# Patient Record
Sex: FEMALE | Race: BLACK OR AFRICAN AMERICAN | Employment: OTHER | URBAN - METROPOLITAN AREA
[De-identification: names, ages, dates, MRNs, and addresses within clinical notes are randomized per-mention and may not be internally consistent; named-entity substitution may affect disease eponyms.]

---

## 2018-04-18 ENCOUNTER — OP HISTORICAL/CONVERTED ENCOUNTER (OUTPATIENT)
Dept: OTHER | Age: 73
End: 2018-04-18

## 2018-04-23 ENCOUNTER — OP HISTORICAL/CONVERTED ENCOUNTER (OUTPATIENT)
Dept: OTHER | Age: 73
End: 2018-04-23

## 2020-10-21 ENCOUNTER — TRANSCRIBE ORDER (OUTPATIENT)
Dept: SCHEDULING | Age: 75
End: 2020-10-21

## 2020-12-13 ENCOUNTER — HOSPITAL ENCOUNTER (OUTPATIENT)
Dept: PREADMISSION TESTING | Age: 75
Discharge: HOME | End: 2020-12-13
Payer: MEDICARE

## 2020-12-13 LAB — SARS-COV-2, COV2: NORMAL

## 2020-12-13 PROCEDURE — 87635 SARS-COV-2 COVID-19 AMP PRB: CPT

## 2020-12-14 LAB — SARS-COV-2, COV2NT: NOT DETECTED

## 2020-12-17 ENCOUNTER — ANESTHESIA EVENT (OUTPATIENT)
Dept: ENDOSCOPY | Age: 75
End: 2020-12-17
Payer: MEDICARE

## 2020-12-17 ENCOUNTER — HOSPITAL ENCOUNTER (OUTPATIENT)
Age: 75
Setting detail: OUTPATIENT SURGERY
Discharge: HOME | End: 2020-12-17
Attending: INTERNAL MEDICINE | Admitting: INTERNAL MEDICINE
Payer: MEDICARE

## 2020-12-17 ENCOUNTER — ANESTHESIA (OUTPATIENT)
Dept: ENDOSCOPY | Age: 75
End: 2020-12-17
Payer: MEDICARE

## 2020-12-17 ENCOUNTER — APPOINTMENT (OUTPATIENT)
Dept: ENDOSCOPY | Age: 75
End: 2020-12-17
Attending: INTERNAL MEDICINE
Payer: MEDICARE

## 2020-12-17 VITALS
HEART RATE: 63 BPM | HEIGHT: 64 IN | WEIGHT: 148 LBS | SYSTOLIC BLOOD PRESSURE: 165 MMHG | BODY MASS INDEX: 25.27 KG/M2 | DIASTOLIC BLOOD PRESSURE: 80 MMHG | TEMPERATURE: 97.1 F | OXYGEN SATURATION: 100 % | RESPIRATION RATE: 18 BRPM

## 2020-12-17 PROCEDURE — 76060000032 HC ANESTHESIA 0.5 TO 1 HR: Performed by: INTERNAL MEDICINE

## 2020-12-17 PROCEDURE — 74011250636 HC RX REV CODE- 250/636: Performed by: NURSE ANESTHETIST, CERTIFIED REGISTERED

## 2020-12-17 PROCEDURE — 74011250636 HC RX REV CODE- 250/636: Performed by: INTERNAL MEDICINE

## 2020-12-17 PROCEDURE — 76040000007: Performed by: INTERNAL MEDICINE

## 2020-12-17 RX ORDER — CARVEDILOL 12.5 MG/1
TABLET ORAL
COMMUNITY

## 2020-12-17 RX ORDER — SODIUM CHLORIDE 9 MG/ML
25 INJECTION, SOLUTION INTRAVENOUS CONTINUOUS
Status: DISCONTINUED | OUTPATIENT
Start: 2020-12-17 | End: 2020-12-17 | Stop reason: HOSPADM

## 2020-12-17 RX ORDER — AMLODIPINE BESYLATE 10 MG/1
TABLET ORAL
COMMUNITY
End: 2021-06-18

## 2020-12-17 RX ORDER — ASPIRIN 81 MG/1
TABLET ORAL
COMMUNITY
End: 2021-06-18

## 2020-12-17 RX ORDER — FAMOTIDINE 20 MG/1
TABLET, FILM COATED ORAL
COMMUNITY

## 2020-12-17 RX ORDER — PROPOFOL 10 MG/ML
INJECTION, EMULSION INTRAVENOUS AS NEEDED
Status: DISCONTINUED | OUTPATIENT
Start: 2020-12-17 | End: 2020-12-17 | Stop reason: HOSPADM

## 2020-12-17 RX ORDER — SODIUM CHLORIDE, SODIUM LACTATE, POTASSIUM CHLORIDE, CALCIUM CHLORIDE 600; 310; 30; 20 MG/100ML; MG/100ML; MG/100ML; MG/100ML
75 INJECTION, SOLUTION INTRAVENOUS CONTINUOUS
Status: DISCONTINUED | OUTPATIENT
Start: 2020-12-17 | End: 2020-12-17 | Stop reason: HOSPADM

## 2020-12-17 RX ADMIN — PROPOFOL 50 MG: 10 INJECTION, EMULSION INTRAVENOUS at 12:31

## 2020-12-17 RX ADMIN — SODIUM CHLORIDE, POTASSIUM CHLORIDE, SODIUM LACTATE AND CALCIUM CHLORIDE: 600; 310; 30; 20 INJECTION, SOLUTION INTRAVENOUS at 11:17

## 2020-12-17 RX ADMIN — PROPOFOL 50 MG: 10 INJECTION, EMULSION INTRAVENOUS at 12:28

## 2020-12-17 NOTE — ANESTHESIA POSTPROCEDURE EVALUATION
Procedure(s):  SIGMOIDOSCOPY.     general, total IV anesthesia    Anesthesia Post Evaluation      Multimodal analgesia: multimodal analgesia not used between 6 hours prior to anesthesia start to PACU discharge  Patient location during evaluation: bedside  Patient participation: waiting for patient participation  Level of consciousness: sleepy but conscious  Pain management: adequate  Airway patency: patent  Anesthetic complications: no  Cardiovascular status: stable  Respiratory status: spontaneous ventilation  Hydration status: stable  Post anesthesia nausea and vomiting:  none  Final Post Anesthesia Temperature Assessment:  Normothermia (36.0-37.5 degrees C)      INITIAL Post-op Vital signs:   Vitals Value Taken Time   /66 12/17/20 1237   Temp 36.5 °C (97.7 °F) 12/17/20 1237   Pulse 55 12/17/20 1237   Resp 17 12/17/20 1237   SpO2 100 % 12/17/20 1237

## 2020-12-17 NOTE — PROGRESS NOTES
Patient stable, and alert. Discharge education given verbally, and patient gave verbal understanding of discharge education. IV out and no s/s of infection. Patient stated not in any pain. Patient taken to front entrance to ride's car via wheelchair.

## 2021-06-18 ENCOUNTER — HOSPITAL ENCOUNTER (OUTPATIENT)
Dept: WOUND CARE | Age: 76
Discharge: HOME | End: 2021-06-18
Admitting: SPECIALIST
Payer: MEDICARE

## 2021-06-18 VITALS
TEMPERATURE: 98.4 F | WEIGHT: 137 LBS | HEIGHT: 65 IN | RESPIRATION RATE: 18 BRPM | BODY MASS INDEX: 22.82 KG/M2 | DIASTOLIC BLOOD PRESSURE: 86 MMHG | SYSTOLIC BLOOD PRESSURE: 183 MMHG | HEART RATE: 78 BPM

## 2021-06-18 PROBLEM — I87.312 IDIOPATHIC CHRONIC VENOUS HYPERTENSION OF LEFT LOWER EXTREMITY WITH ULCER (HCC): Status: ACTIVE | Noted: 2021-06-18

## 2021-06-18 PROBLEM — L97.929 IDIOPATHIC CHRONIC VENOUS HYPERTENSION OF LEFT LOWER EXTREMITY WITH ULCER (HCC): Status: ACTIVE | Noted: 2021-06-18

## 2021-06-18 PROBLEM — R60.0 EDEMA, LEG: Status: ACTIVE | Noted: 2021-06-18

## 2021-06-18 PROCEDURE — 74011000250 HC RX REV CODE- 250: Performed by: SPECIALIST

## 2021-06-18 PROCEDURE — 99203 OFFICE O/P NEW LOW 30 MIN: CPT

## 2021-06-18 RX ORDER — ERGOCALCIFEROL 1.25 MG/1
50000 CAPSULE ORAL DAILY
COMMUNITY

## 2021-06-18 RX ORDER — CLONIDINE 0.2 MG/24H
1 PATCH, EXTENDED RELEASE TRANSDERMAL
COMMUNITY

## 2021-06-18 RX ORDER — CALCIUM CARBONATE 500(1250)
TABLET ORAL DAILY
COMMUNITY

## 2021-06-18 RX ORDER — LIDOCAINE HYDROCHLORIDE 20 MG/ML
15 SOLUTION OROPHARYNGEAL AS NEEDED
Status: DISCONTINUED | OUTPATIENT
Start: 2021-06-18 | End: 2021-06-20 | Stop reason: HOSPADM

## 2021-06-18 RX ORDER — POTASSIUM CHLORIDE 20 MEQ/1
20 TABLET, EXTENDED RELEASE ORAL 2 TIMES DAILY
COMMUNITY

## 2021-06-18 RX ORDER — DEXAMETHASONE 2 MG/1
20 TABLET ORAL
COMMUNITY

## 2021-06-18 NOTE — WOUND CARE
[address] Progress Note and Procedure Note Audrey Moyer MEDICAL RECORD NUMBER:  [id number] AGE: 67 y.o. RACE BLACK/  GENDER: female  : 1945 EPISODE DATE:  2021 Subjective:  
76-year-old female presents with a 1 month history of a wound on the left leg. Apparently, this began as a blister, which subsequently ruptured leaving a small wound. She has been undergoing home health care wound care with [facility]. Chief Complaint Patient presents with  Wound Check  
  left leg HISTORY of PRESENT ILLNESS HPI Audrey Moyer is a 67 y.o. female who presents today for wound/ulcer evaluation. History of Wound Context: L leg Wound/Ulcer Pain Timing/Severity: mild Quality of pain: sharp Severity:  2 / 10 Modifying Factors: Pain is relieved/improved with rest 
Associated Signs/Symptoms: edema Ulcer Identification: 
Ulcer Type: venous Contributing Factors: edema Wound: L leg PAST MEDICAL HISTORY Past Medical History:  
Diagnosis Date  Arthritis  GERD (gastroesophageal reflux disease)  Hypertension  Immunosuppression due to drug therapy ([address]  Multiple myeloma ([address]  Thromboembolus ([address] PAST SURGICAL HISTORY Past Surgical History:  
Procedure Laterality Date  HX BONE MARROW TRANSPLANT  HX GYN    
 vaginal prolaps FAMILY HISTORY Family History Problem Relation Age of Onset  Hypertension Mother  Hypertension Father SOCIAL HISTORY Social History Tobacco Use  Smoking status: Former Smoker  Smokeless tobacco: Never Used  Tobacco comment: quit 50 years ago Substance Use Topics  Alcohol use: Never  Drug use: Never ALLERGIES Allergies Allergen Reactions  Decamethonium Unknown (comments) She thinks it made her bleed MEDICATIONS Current Outpatient Medications on File Prior to Encounter Medication Sig Dispense Refill  potassium chloride (Klor-Con M20) 20 mEq tablet Take 20 mEq by mouth two (2) times a day.  cloNIDine (CATAPRES) 0.2 mg/24 hr ptwk 1 Patch by TransDERmal route every seven (7) days.  dexAMETHasone (DECADRON) 2 mg tablet Take 20 mg by mouth every seven (7) days.  ergocalciferol (Vitamin D2) 1,250 mcg (50,000 unit) capsule Take 50,000 Units by mouth daily.  calcium carbonate (OS-NICOLE) 500 mg calcium (1,250 mg) tablet Take  by mouth daily.  carvediloL (COREG) 12.5 mg tablet carvedilol 12.5 mg tablet Take 1 tablet twice a day by oral route.  rivaroxaban (Xarelto) 10 mg tablet Take 20 mg by mouth daily (with breakfast).  famotidine (PEPCID) 20 mg tablet famotidine 20 mg tablet Take 1 tablet twice a day by oral route. No current facility-administered medications on file prior to encounter. REVIEW OF SYSTEMS Pertinent items are noted in HPI. Objective:  
 
Visit Vitals BP (!) 183/86 (BP 1 Location: Right arm, BP Patient Position: At rest;Sitting) Pulse 78 Temp 98.4 °F (36.9 °C) Resp 18 Ht 5' 5\" (1.651 m) Wt 62.1 kg (137 lb) BMI 22.80 kg/m² Wt Readings from Last 3 Encounters:  
06/18/21 62.1 kg (137 lb) 12/09/20 67.1 kg (148 lb) PHYSICAL EXAM 
L leg wound with skin changes c/w venous disease Moderate slough debrided Pertinent items are noted in HPI. Assessment:  
Left leg venous  ulcer Problem List Items Addressed This Visit None POP IN PROCEDURE TYPE (DEBRIDEMENT, BIOPSY, I&D, SKIN SUB, CHEMICAL CAUERTY) Plan:  
Non-invasive venous study - patient wants to hold off on this at this time Start [facility] Compression with double tubi  Leg elevation Treatment Note please see attached Discharge Instructions Written patient dismissal instructions given to patient or POA. Electronically signed by Yoly Hill MD on 6/18/2021 at 11:23 AM 
 
 
 
 
 
 
Debridement Wound Care Problem List Items Addressed This Visit None Procedure Note Indications:  Based on my examination of this patient's wound(s)/ulcer(s) today, debridement is required to promote healing and evaluate the wound base. Performed by: Janith Hodgkins, MD 
 
Consent obtained: Yes Time out taken: Yes Debridement: Excisional 
 
Using curette the wound(s)/ulcer(s) was/were sharply debrided down through and including the removal of   
dermis and subcutaneous tissue Devitalized Tissue Debrided: slough Pre Debridement Measurements: 
Are located in the [place]  Documentation Flow Sheet Non-Pressure ulcer, fat layer exposed Wound/Ulcer #: 1 Post Debridement Measurements: 
Wound/Ulcer Descriptions are Pre Debridement except measurements: 
 
Wound Leg lower Left #1 06/18/21 (Active) Wound Etiology Venous 06/18/21 1039 Dressing Status Clean;Dry; Intact 06/18/21 1039 Cleansed Cleansed with saline 06/18/21 1039 Wound Length (cm) 2.5 cm 06/18/21 1039 Wound Width (cm) 3.5 cm 06/18/21 1039 Wound Depth (cm) 0.1 cm 06/18/21 1039 Wound Surface Area (cm^2) 8.75 cm^2 06/18/21 1039 Wound Volume (cm^3) 0.88 cm^3 06/18/21 1039 Post-Procedure Length (cm) 2.5 cm 06/18/21 1041 Post-Procedure Width (cm) 3.5 cm 06/18/21 1041 Post-Procedure Depth (cm) 0.2 cm 06/18/21 1041 Post-Procedure Surface Area (cm^2) 8.75 cm^2 06/18/21 1041 Post-Procedure Volume (cm^3) 1.75 cm^3 06/18/21 1041 Wound Assessment Slough;Granulation tissue 06/18/21 1039 Drainage Amount Small 06/18/21 1039 Drainage Description Serosanguinous 06/18/21 1039 Wound Odor None 06/18/21 1039 Adwoa-Wound/Incision Assessment Intact 06/18/21 1039 Edges Attached edges 06/18/21 1039 Wound Thickness Description Full thickness 06/18/21 1039 Number of days: 0 Total Surface Area Debrided:  8 sq cm Estimated Blood Loss:  None Hemostasis Achieved: Pressure Procedural Pain: 0 / 10 Post Procedural Pain: 0 / 10 Response to treatment: Well tolerated by patient

## 2021-06-18 NOTE — DISCHARGE INSTRUCTIONS
Discharge Instructions for  [address] [place] [address], [address] [place] [address]  Telephone: [phone]    NAME:  Refugio Mortimer OF BIRTH:  1945  MEDICAL RECORD NUMBER:  [id number]  DATE:  6/18/2021      Return Appointment:  [] Dressing supply provider:   [x] Home Healthcare: [address] [place] [address] please note change below  [] Return Appointment: 1 Week(s)  [] Nurse Visit:  Week(s)    [] Discharge from [facility]  [x] Ordered tests: Wanted to get vascular studies done patient asked to wait  [] Referrals:   [] Rx:     Wound Cleansing:   Do not scrub or use excessive force. Cleanse wound prior to applying a clean dressing with:  [x] Normal Saline   [] Mild Soap & Water    [] Keep Wound Dry in Shower  [] Other:      Topical Treatments:  Do not apply lotions, creams, or ointments to wound bed unless directed. [] Apply moisturizing lotion to skin surrounding the wound prior to dressing change.  [] Apply antifungal ointment to skin surrounding the wound prior to dressing change.  [] Apply thin film of moisture barrier ointment to skin immediately around wound. [] Other:       Dressings:           Wound Location Left lower leg   [x] Apply Primary Dressing:       [] MediHoney Gel    [] MediHoney Alginate               [] Calcium Alginate      [] Calcium Alginate with Silver   [] Collagen                   [x] Collagen with Silver                [] Santyl with Moistened saline gauze              [] Hydrofera Blue (cut to size and moistened)  [] Hydrofera Blue Ready (Cut to size)   [] NS wet to dry    [] Betadine wet to dry              [] Hydrogel                [] Mepitel     [] Bactroban/Mupirocin               [] Other:     [x] Cover and Secure with:     [x] Gauze [] Sherrine Darren [] Kerlix   [] Foam [x] Super Absorbant [] ABD     [] ACE Wrap            [] Other:    Avoid contact of tape with skin.     [x] Change dressing: [] Daily    [] Every Other Day [] Once per week   [] Twice per week   [x] Three times per week [] Do Not Change Dressing   [] Other:     Negative Pressure:           Wound Location:   [] Pressure @  mm/Hg  []Continuous []Intermittent   [] Black  [] White Foam              [] Bridge:               [] Change vac dressing three times per week    Pressure Relief:  [] When sitting, shift position or do seat lifts every 15 minutes.  [] Wheelchair cushion [] Specialty Bed/Mattress  [] Turn every 2 hours when in bed. Avoid direct pressure on wound site. Limit side lying to 30 degree tilt. Limit HOB elevation to 30 degrees. Edema Control:  Apply: [] Compression Stocking []Right Leg []Left Leg   [x] Tubigrip []Right Leg Double Layer [x]Left Leg Double Layer      []Right Leg Single Layer []Left Leg Single Layer     [x] Elevate leg(s) above the level of the heart when sitting. [x] Avoid prolonged standing in one place. Compression:  Apply: [] Multilayer Compression Wrap: []RightLeg []Left Leg                                 []Profore  []Profore Lite             []Unna     [] Do not get leg(s) with wrap wet. If wraps become too tight call the center or completely remove the wrap. [] Elevate leg(s) above the level of the heart when sitting. [] Avoid prolonged standing in one place. Off-Loading:   [] Off-loading when [] walking  [] in bed [] sitting  [] Total non-weight bearing  [] Right Leg  [] Left Leg   [] Assistive Device [] Walker [] Cane      [] Wheelchair  [] Crutches   [] Surgical shoe    [] Podus Boot(s)   [] Foam Boot(s)  [] Roll About    [] Cast Boot [] CROW Boot  [] Wedge Shoe  [] Other:    Contact Cast:  Apply: [] Total Contact Cast Applied in Clinic []RightLeg []Left Leg   [] Do not get cast wet. Contact center or go to emergency room if there is a foul odor or becomes uncomfortable due to feeling tight or swelling. Do not use objects inside of cast to scratch.       Dietary:  [x] Diet as tolerated: [] Calorie Diabetic Diet: [] No Added Salt:  [x] Increase Protein: [] Other:     Activity:  [x] Activity as tolerated:    [] Patient has no activity restrictions       [] Strict Bedrest:   [] Remain off Work:       [] May return to full duty work:                                     [] Return to work with restrictions:                Electronically signed [place] RN on 6/18/2021 at 11:09 AM     [address]: Should you experience any significant changes in your wound(s) or have questions about your wound care, please contact [address] at [place] [phone] 8:00 am - 4:30. If you need help with your wound outside of these hours and cannot wait until we are again available, contact your PCP or go to the hospital emergency room. PLEASE NOTE: IF YOU ARE UNABLE TO OBTAIN WOUND SUPPLIES, CONTINUE TO USE THE SUPPLIES YOU HAVE AVAILABLE UNTIL YOU ARE ABLE TO REACH US. IT IS MOST IMPORTANT TO KEEP THE WOUND COVERED AT ALL TIMES.     [x] Dr. Janeth Alvarez   [] Dr. Selena Lee  [] Dr. Kaz Dill  [] Dr. Nena Prakash

## 2021-06-18 NOTE — WOUND CARE
Discharge Instructions for [address] [facility], [address] Telephone: [phone] NAME:  Alesia Martinez YOB: 1945 MEDICAL RECORD NUMBER:  [id number] DATE:  6/18/2021 Return Appointment: 
[] Dressing supply provider:  
[x] Home Healthcare: [address] [place] [address] please note change below 
[] Return Appointment: 1 Week(s) 
[] Nurse Visit:  Week(s) 
 
[] Discharge from [facility] [x] Ordered tests: Wanted to get vascular studies done patient asked to wait 
[] Referrals:  
[] Rx:  
 
Wound Cleansing: Do not scrub or use excessive force. Cleanse wound prior to applying a clean dressing with: 
[x] Normal Saline [] Mild Soap & Water   
[] Keep Wound Dry in Shower 
[] Other:   
 
Topical Treatments: Do not apply lotions, creams, or ointments to wound bed unless directed. [] Apply moisturizing lotion to skin surrounding the wound prior to dressing change. 
[] Apply antifungal ointment to skin surrounding the wound prior to dressing change. 
[] Apply thin film of moisture barrier ointment to skin immediately around wound. [] Other:   
  
Dressings:           Wound Location Left lower leg [x] Apply Primary Dressing:     
 [] MediHoney Gel    [] MediHoney Alginate  
            [] Calcium Alginate      [] Calcium Alginate with Silver 
 [] Collagen                   [x] Collagen with Silver   
            [] Santyl with Moistened saline gauze 
            [] Hydrofera Blue (cut to size and moistened)  [] Hydrofera Blue Ready (Cut to size) [] NS wet to dry    [] Betadine wet to dry 
            [] Hydrogel                [] Mepitel   
 [] Bactroban/Mupirocin  
            [] Other:  
 
[x] Cover and Secure with:   
 [x] Gauze [] Eileen [] Kerlix [] Foam [x] Super Absorbant [] ABD [] ACE Wrap            [] Other:  
 Avoid contact of tape with skin.  
 
[x] Change dressing: [] Daily    [] Every Other Day [] Once per week   [] Twice per week [x] Three times per week [] Do Not Change Dressing   [] Other: 
  
Negative Pressure:           Wound Location:  
[] Pressure @  mm/Hg  []Continuous []Intermittent 
 [] Black  [] White Foam 
            [] Bridge:  
            [] Change vac dressing three times per week Pressure Relief: 
[] When sitting, shift position or do seat lifts every 15 minutes. 
[] Wheelchair cushion [] Specialty Bed/Mattress 
[] Turn every 2 hours when in bed. Avoid direct pressure on wound site. Limit side lying to 30 degree tilt. Limit HOB elevation to 30 degrees. Edema Control: 
Apply: [] Compression Stocking []Right Leg []Left Leg 
 [x] Tubigrip []Right Leg Double Layer [x]Left Leg Double Layer []Right Leg Single Layer []Left Leg Single Layer [x] Elevate leg(s) above the level of the heart when sitting. [x] Avoid prolonged standing in one place. Compression: 
Apply: [] Multilayer Compression Wrap: []RightLeg []Left Leg 
                               []Profore  []Profore Lite             []Unna 
 
 [] Do not get leg(s) with wrap wet. If wraps become too tight call the center or completely remove the wrap. [] Elevate leg(s) above the level of the heart when sitting. [] Avoid prolonged standing in one place. Off-Loading:  
[] Off-loading when [] walking  [] in bed [] sitting 
[] Total non-weight bearing  [] Right Leg  [] Left Leg  
[] Assistive Device [] Walker [] Cane      [] Wheelchair  [] Crutches 
 [] Surgical shoe    [] Podus Boot(s)   [] Foam Boot(s)  [] Roll About 
  [] Cast Boot [] CROW Boot  [] Wedge Shoe  [] Other: 
 
Contact Cast: Apply: [] Total Contact Cast Applied in Clinic []RightLeg []Left Leg 
 [] Do not get cast wet. Contact center or go to emergency room if there is a foul odor or becomes uncomfortable due to feeling tight or swelling. Do not use objects inside of cast to scratch.    
 
Dietary: 
[x] Diet as tolerated: [] Calorie Diabetic Diet: [] No Added Salt: 
[x] Increase Protein: [] Other:  
 
Activity: 
[x] Activity as tolerated:   
[] Patient has no activity restrictions [] Strict Bedrest:  
[] Remain off Work:      
[] May return to full duty work:                                    
[] Return to work with restrictions:  
         
 
 Electronically signed Kristen Toledo RN on 6/18/2021 at 11:09 AM  
 
[address]: Should you experience any significant changes in your wound(s) or have questions about your wound care, please contact [address] at [place] [address] 8:00 am - 4:30. If you need help with your wound outside of these hours and cannot wait until we are again available, contact your PCP or go to the hospital emergency room. PLEASE NOTE: IF YOU ARE UNABLE TO OBTAIN WOUND SUPPLIES, CONTINUE TO USE THE SUPPLIES YOU HAVE AVAILABLE UNTIL YOU ARE ABLE TO REACH US. IT IS MOST IMPORTANT TO KEEP THE WOUND COVERED AT ALL TIMES. [x] Dr. Carmencita Grubbs  
[] Dr. Thais Johnson 
[] Dr. Bernie Garrison 
[] Dr. Alfonso Almonte

## 2021-06-25 ENCOUNTER — HOSPITAL ENCOUNTER (OUTPATIENT)
Dept: WOUND CARE | Age: 76
Discharge: HOME | End: 2021-06-25
Admitting: SPECIALIST
Payer: MEDICARE

## 2021-06-25 VITALS
HEART RATE: 78 BPM | SYSTOLIC BLOOD PRESSURE: 138 MMHG | RESPIRATION RATE: 18 BRPM | DIASTOLIC BLOOD PRESSURE: 80 MMHG | TEMPERATURE: 98.7 F

## 2021-06-25 PROCEDURE — 99213 OFFICE O/P EST LOW 20 MIN: CPT

## 2021-06-25 PROCEDURE — 74011000250 HC RX REV CODE- 250: Performed by: SPECIALIST

## 2021-06-25 RX ORDER — LIDOCAINE HYDROCHLORIDE 20 MG/ML
15 SOLUTION OROPHARYNGEAL AS NEEDED
Status: DISCONTINUED | OUTPATIENT
Start: 2021-06-25 | End: 2021-06-27 | Stop reason: HOSPADM

## 2021-06-25 NOTE — WOUND CARE
[address]   Progress Note and Procedure Note   NO Procedure      [address] [facility] RECORD NUMBER:  [id number]  AGE: 67 y.o. RACE BLACK/  GENDER: female  : 1945  EPISODE DATE:  2021    Subjective:   No fever, no drainage, no pain  Chief Complaint   Patient presents with    Wound Check     left lower leg         HISTORY of PRESENT ILLNESS HPI    Pedro Weiner is a 67 y.o. female who presents today for wound/ulcer evaluation. History of Wound Context: L leg  Wound/Ulcer Pain Timing/Severity: mild  Quality of pain: dull  Severity:  2 / 10   Modifying Factors: Pain is relieved/improved with rest  Associated Signs/Symptoms: edema    Ulcer Identification:  Ulcer Type: venous    Contributing Factors: venous stasis    Wound: L leg         PAST MEDICAL HISTORY    Past Medical History:   Diagnosis Date    Arthritis     GERD (gastroesophageal reflux disease)     Hypertension     Immunosuppression due to drug therapy ([address]     Multiple myeloma ([facility])     Thromboembolus ([facility]         PAST SURGICAL HISTORY    Past Surgical History:   Procedure Laterality Date    HX BONE MARROW TRANSPLANT      HX GYN      vaginal prolaps       FAMILY HISTORY    Family History   Problem Relation Age of Onset    Hypertension Mother     Hypertension Father        SOCIAL HISTORY    Social History     Tobacco Use    Smoking status: Former Smoker    Smokeless tobacco: Never Used    Tobacco comment: quit 50 years ago   Substance Use Topics    Alcohol use: Never    Drug use: Never       ALLERGIES    Allergies   Allergen Reactions    Decamethonium Unknown (comments)     She thinks it made her bleed       MEDICATIONS    Current Outpatient Medications on File Prior to Encounter   Medication Sig Dispense Refill    potassium chloride (Klor-Con M20) 20 mEq tablet Take 20 mEq by mouth two (2) times a day.       cloNIDine (CATAPRES) 0.2 mg/24 hr ptwk 1 Patch by TransDERmal route every seven (7) days.  dexAMETHasone (DECADRON) 2 mg tablet Take 20 mg by mouth every seven (7) days.  ergocalciferol (Vitamin D2) 1,250 mcg (50,000 unit) capsule Take 50,000 Units by mouth daily.  calcium carbonate (OS-NICOLE) 500 mg calcium (1,250 mg) tablet Take  by mouth daily.  carvediloL (COREG) 12.5 mg tablet carvedilol 12.5 mg tablet   Take 1 tablet twice a day by oral route.  famotidine (PEPCID) 20 mg tablet famotidine 20 mg tablet   Take 1 tablet twice a day by oral route.  rivaroxaban (Xarelto) 10 mg tablet Take 20 mg by mouth daily (with breakfast). No current facility-administered medications on file prior to encounter. REVIEW OF SYSTEMS    Pertinent items are noted in HPI. Objective:     Visit Vitals  /80   Pulse 78   Temp 98.7 °F (37.1 °C)   Resp 18       Wt Readings from Last 3 Encounters:   06/18/21 62.1 kg (137 lb)   12/09/20 67.1 kg (148 lb)       PHYSICAL EXAM  L leg wound measurements not significantly changed, wound small and superficial  No evidence of infection  Pertinent items are noted in HPI. Assessment:   good progress  Problem List Items Addressed This Visit     None          Procedure Note  Indications:  Based on my examination of this patient's wound(s)/ulcer(s) today, debridement is not required to promote healing and evaluate the wound base. Wound Leg lower Left #1 06/18/21 (Active)   Wound Image   06/25/21 1010   Wound Etiology Venous 06/25/21 1010   Dressing Status Clean;Dry; Intact 06/25/21 1010   Cleansed Cleansed with saline 06/25/21 1010   Wound Length (cm) 1.8 cm 06/25/21 1010   Wound Width (cm) 2 cm 06/25/21 1010   Wound Depth (cm) 0.1 cm 06/25/21 1010   Wound Surface Area (cm^2) 3.6 cm^2 06/25/21 1010   Change in Wound Size % 58.86 06/25/21 1010   Wound Volume (cm^3) 0.36 cm^3 06/25/21 1010   Wound Healing % 59 06/25/21 1010   Post-Procedure Length (cm) 2.5 cm 06/18/21 1041   Post-Procedure Width (cm) 3.5 cm 06/18/21 1041   Post-Procedure Depth (cm) 0.2 cm 06/18/21 1041   Post-Procedure Surface Area (cm^2) 8.75 cm^2 06/18/21 1041   Post-Procedure Volume (cm^3) 1.75 cm^3 06/18/21 1041   Wound Assessment Granulation tissue 06/25/21 1010   Drainage Amount Scant 06/25/21 1010   Drainage Description Serosanguinous 06/25/21 1010   Wound Odor None 06/25/21 1010   Adwoa-Wound/Incision Assessment Intact 06/25/21 1010   Edges Attached edges 06/25/21 1010   Wound Thickness Description Full thickness 06/25/21 1010   Number of days: 7        Plan:   Continue Isabel  Treatment Note please see attached Discharge Instructions    Written patient dismissal instructions given to patient or POA.          Electronically signed by Sunny Duran MD on 6/25/2021 at 10:18 AM

## 2021-06-25 NOTE — DISCHARGE INSTRUCTIONS
Discharge Instructions for  [address] [place] [address], [facility]  Telephone: [phone]     NAME:  Jorge Blackwell OF BIRTH:  1945  MEDICAL RECORD NUMBER:  [id number]  DATE:  6/25/2021        Return Appointment:  []? Dressing supply provider:   [x]? Home Healthcare: [facility]   []? Return Appointment: 2 Week(s)  []? Nurse Visit:  Week(s)     []? Discharge from [facility]  [x]? Ordered tests: Wanted to get vascular studies done patient asked to wait  []? Referrals:   []? Rx:      Wound Cleansing:   Do not scrub or use excessive force. Cleanse wound prior to applying a clean dressing with:  [x]? Normal Saline          []? Mild Soap & Water    []? Keep Wound Dry in Shower  []? Other:       Topical Treatments:  Do not apply lotions, creams, or ointments to wound bed unless directed. []? Apply moisturizing lotion to skin surrounding the wound prior to dressing change. []? Apply antifungal ointment to skin surrounding the wound prior to dressing change. []? Apply thin film of moisture barrier ointment to skin immediately around wound. []? Other:                  Dressings:                  Wound Location Left lower leg        [x]? Apply Primary Dressing:                                          []? MediHoney Gel         []? MediHoney Alginate                     []? Calcium Alginate      []? Calcium Alginate with Silver              []? Collagen                   [x]? Collagen with Silver                []? Santyl with Moistened saline gauze              []? Hydrofera Blue (cut to size and moistened)  []? Hydrofera Blue Ready (Cut to size)              []? NS wet to dry            []? Betadine wet to dry              []? Hydrogel                   []? Mepitel                   []? Bactroban/Mupirocin                       []? Other:      [x]? Cover and Secure with:                   [x]? Gauze        [x]? Elsy Hanna           []? Kerlix              []? Foam          []? Super Absorbant    []? ABD                                      []? ACE Wrap            []? Other:               Avoid contact of tape with skin.     [x]? Change dressing:  []? Daily           []? Every Other Day    []? Once per week   []? Twice per week              [x]? Three times per week        []? Do Not Change Dressing    []? Other:                Negative Pressure:           Wound Location:   []? Pressure @  mm/Hg                      []?Continuous  []? Intermittent              []? Black          []? White Foam              []? Bridge:               []? Change vac dressing three times per week     Pressure Relief:  []? When sitting, shift position or do seat lifts every 15 minutes. []? Wheelchair cushion           []? Specialty Bed/Mattress  []? Turn every 2 hours when in bed. Avoid direct pressure on wound site. Limit side lying to 30 degree tilt. Limit HOB elevation to 30 degrees. Edema Control:  Apply:  []? Compression Stocking      []? Right Leg     []? Left Leg              [x]? Tubigrip      []? Right Leg Double Layer      [x]? Left Leg Double Layer                                      []?Right Leg Single Layer       []? Left Leg Single Layer                 [x]? Elevate leg(s) above the level of the heart when sitting. [x]? Avoid prolonged standing in one place.         Compression:  Apply:  []? Multilayer Compression Wrap:      []? RightLeg      []? Left Leg                                 []?Profore            []? Profore Lite             []?Unna                 []? Do not get leg(s) with wrap wet. If wraps become too tight call the center or completely remove the wrap. []? Elevate leg(s) above the level of the heart when sitting. []? Avoid prolonged standing in one place.     Off-Loading:   []? Off-loading when   []? walking       []? in bed         []? sitting  []? Total non-weight bearing  []? Right Leg  []? Left Leg         []? Assistive Device    []? Fartun Realexandro        []? Cane      []? Wheelchair      []? Crutches              []? Surgical shoe    []? Podus Boot(s)   []? Foam Boot(s)  []? Roll About              []? Cast Boot   []? CROW Boot  []? Wedge Shoe  []? Other:     Contact Cast:  Apply:  []? Total Contact Cast Applied in Clinic          []? RightLeg      []? Left Leg              []? Do not get cast wet. Contact center or go to emergency room if there is a foul odor or becomes uncomfortable due to feeling tight or swelling. Do not use objects inside of cast to scratch.                  Dietary:  [x]? Diet as tolerated:   []? Calorie Diabetic Diet:         []? No Added Salt:  [x]? Increase Protein:   []? Other:              Activity:  [x]? Activity as tolerated:    []? Patient has no activity restrictions       []? Strict Bedrest:          []? Remain off Work:       []? May return to full duty work:                                               []? Return to work with restrictions:                    Electronically signed Damian Gu RN on 6/25/2021 at 10:15 AM   [address] [place] [address] Information: Should you experience any significant changes in your wound(s) or have questions about your wound care, please contact Errol Flores at [place] [address] 8:00 am - 4:30. If you need help with your wound outside of these hours and cannot wait until we are again available, contact your PCP or go to the hospital emergency room.      PLEASE NOTE: IF YOU ARE UNABLE TO [facility], CONTINUE TO USE THE SUPPLIES YOU HAVE AVAILABLE UNTIL YOU ARE ABLE TO [facility]. IT IS MOST IMPORTANT TO KEEP THE WOUND COVERED AT ALL TIMES.     [x]? Dr. Mark Farley   []? Dr. Rylie Muhammad  []? Dr. Ilia Hurtado  []?  Dr. Merari Ortega

## 2021-07-09 ENCOUNTER — HOSPITAL ENCOUNTER (OUTPATIENT)
Dept: WOUND CARE | Age: 76
Discharge: HOME | End: 2021-07-09
Payer: MEDICARE

## 2021-07-09 VITALS
DIASTOLIC BLOOD PRESSURE: 74 MMHG | SYSTOLIC BLOOD PRESSURE: 138 MMHG | TEMPERATURE: 97.5 F | HEART RATE: 60 BPM | RESPIRATION RATE: 18 BRPM

## 2021-07-09 PROCEDURE — 99211 OFF/OP EST MAY X REQ PHY/QHP: CPT

## 2021-07-09 NOTE — WOUND CARE
[address]   Progress Note and Procedure Note   NO Procedure      [facility] RECORD NUMBER:  [id number]  AGE: 67 y.o. RACE BLACK/  GENDER: female  : 1945  EPISODE DATE:  2021    Subjective:   No new problems  Chief Complaint   Patient presents with    Wound Check     left leg         HISTORY of PRESENT ILLNESS HPI    Dano Phoenix is a 67 y.o. female who presents today for wound/ulcer evaluation. History of Wound Context: L leg  Wound/Ulcer Pain Timing/Severity: mild  Quality of pain: sharp  Severity:  2 / 10   Modifying Factors: None  Associated Signs/Symptoms: edema    Ulcer Identification:  Ulcer Type: venous    Contributing Factors: edema    Wound: L leg         PAST MEDICAL HISTORY    Past Medical History:   Diagnosis Date    Arthritis     GERD (gastroesophageal reflux disease)     Hypertension     Immunosuppression due to drug therapy ([facility] [address]     Multiple myeloma (HCC)     Thromboembolus ([facility] [address]         PAST SURGICAL HISTORY    Past Surgical History:   Procedure Laterality Date    HX BONE MARROW TRANSPLANT      HX GYN      vaginal prolaps       FAMILY HISTORY    Family History   Problem Relation Age of Onset    Hypertension Mother     Hypertension Father        SOCIAL HISTORY    Social History     Tobacco Use    Smoking status: Former Smoker    Smokeless tobacco: Never Used    Tobacco comment: quit 50 years ago   Substance Use Topics    Alcohol use: Never    Drug use: Never       ALLERGIES    Allergies   Allergen Reactions    Decamethonium Unknown (comments)     She thinks it made her bleed       MEDICATIONS    Current Outpatient Medications on File Prior to Encounter   Medication Sig Dispense Refill    potassium chloride (Klor-Con M20) 20 mEq tablet Take 20 mEq by mouth two (2) times a day.  cloNIDine (CATAPRES) 0.2 mg/24 hr ptwk 1 Patch by TransDERmal route every seven (7) days.       dexAMETHasone (DECADRON) 2 mg tablet Take 20 mg by mouth every seven (7) days.  ergocalciferol (Vitamin D2) 1,250 mcg (50,000 unit) capsule Take 50,000 Units by mouth daily.  calcium carbonate (OS-NICOLE) 500 mg calcium (1,250 mg) tablet Take  by mouth daily.  carvediloL (COREG) 12.5 mg tablet carvedilol 12.5 mg tablet   Take 1 tablet twice a day by oral route.  famotidine (PEPCID) 20 mg tablet famotidine 20 mg tablet   Take 1 tablet twice a day by oral route.  rivaroxaban (Xarelto) 10 mg tablet Take 20 mg by mouth daily (with breakfast). No current facility-administered medications on file prior to encounter. REVIEW OF SYSTEMS    Pertinent items are noted in HPI. Objective:     Visit Vitals  /74 (BP 1 Location: Left upper arm, BP Patient Position: At rest;Sitting)   Pulse 60   Temp 97.5 °F (36.4 °C)   Resp 18       Wt Readings from Last 3 Encounters:   06/18/21 62.1 kg (137 lb)   12/09/20 67.1 kg (148 lb)       PHYSICAL EXAM  L leg wound epithelialized, no open wound identified  Pertinent items are noted in HPI. Assessment:    wound healed  Problem List Items Addressed This Visit     None          Procedure Note  Indications:  Based on my examination of this patient's wound(s)/ulcer(s) today, debridement is not required to promote healing and evaluate the wound base. Wound Leg lower Left #1 06/18/21 (Active)   Wound Image   07/09/21 0903   Wound Etiology Venous 07/09/21 0903   Dressing Status Clean;Dry; Intact 07/09/21 0903   Cleansed Cleansed with saline 07/09/21 0903   Wound Length (cm) 0 cm 07/09/21 0903   Wound Width (cm) 0 cm 07/09/21 0903   Wound Depth (cm) 0 cm 07/09/21 0903   Wound Surface Area (cm^2) 0 cm^2 07/09/21 0903   Change in Wound Size % 100 07/09/21 0903   Wound Volume (cm^3) 0 cm^3 07/09/21 0903   Wound Healing % 100 07/09/21 0903   Post-Procedure Length (cm) 2.5 cm 06/18/21 1041   Post-Procedure Width (cm) 3.5 cm 06/18/21 1041   Post-Procedure Depth (cm) 0.2 cm 06/18/21 1041 Post-Procedure Surface Area (cm^2) 8.75 cm^2 06/18/21 1041   Post-Procedure Volume (cm^3) 1.75 cm^3 06/18/21 1041   Wound Assessment Epithelialization 07/09/21 0903   Drainage Amount None 07/09/21 0903   Drainage Description Serosanguinous 06/25/21 1010   Wound Odor None 07/09/21 0903   [place]-Wound/Incision Assessment Dry/flaky 07/09/21 0903   Edges Attached edges 06/25/21 1010   Wound Thickness Description Full thickness 06/25/21 1010   Number of days: 21        Plan:   RTC as needed  Treatment Note please see attached Discharge Instructions    Written patient dismissal instructions given to patient or POA.          Electronically signed by Natalie Goodman MD on 7/9/2021 at 9:15 AM

## 2021-12-23 ENCOUNTER — APPOINTMENT (OUTPATIENT)
Dept: GENERAL RADIOLOGY | Age: 76
End: 2021-12-23
Attending: STUDENT IN AN ORGANIZED HEALTH CARE EDUCATION/TRAINING PROGRAM
Payer: MEDICARE

## 2021-12-23 ENCOUNTER — APPOINTMENT (OUTPATIENT)
Dept: CT IMAGING | Age: 76
End: 2021-12-23
Attending: STUDENT IN AN ORGANIZED HEALTH CARE EDUCATION/TRAINING PROGRAM
Payer: MEDICARE

## 2021-12-23 ENCOUNTER — HOSPITAL ENCOUNTER (EMERGENCY)
Age: 76
Discharge: HOME | End: 2021-12-23
Attending: STUDENT IN AN ORGANIZED HEALTH CARE EDUCATION/TRAINING PROGRAM | Admitting: STUDENT IN AN ORGANIZED HEALTH CARE EDUCATION/TRAINING PROGRAM
Payer: MEDICARE

## 2021-12-23 VITALS
HEIGHT: 66 IN | DIASTOLIC BLOOD PRESSURE: 88 MMHG | BODY MASS INDEX: 22.5 KG/M2 | WEIGHT: 140 LBS | HEART RATE: 86 BPM | OXYGEN SATURATION: 98 % | TEMPERATURE: 98.3 F | SYSTOLIC BLOOD PRESSURE: 155 MMHG | RESPIRATION RATE: 18 BRPM

## 2021-12-23 DIAGNOSIS — S22.038A OTHER CLOSED FRACTURE OF THIRD THORACIC VERTEBRA, INITIAL ENCOUNTER (HCC): Primary | ICD-10-CM

## 2021-12-23 DIAGNOSIS — H11.31 SUBCONJUNCTIVAL HEMORRHAGE OF RIGHT EYE: ICD-10-CM

## 2021-12-23 DIAGNOSIS — T14.8XXA CONTUSION OF SOFT TISSUE: ICD-10-CM

## 2021-12-23 LAB
ABO + RH BLD: NORMAL
ALBUMIN SERPL-MCNC: 3.7 G/DL (ref 3.5–5)
ALBUMIN/GLOB SERPL: 1.4 {RATIO} (ref 1.1–2.2)
ALP SERPL-CCNC: 76 U/L (ref 45–117)
ALT SERPL-CCNC: 41 U/L (ref 12–78)
ANION GAP SERPL CALC-SCNC: 6 MMOL/L (ref 5–15)
AST SERPL W P-5'-P-CCNC: 32 U/L (ref 15–37)
BASOPHILS # BLD: 0 K/UL (ref 0–0.1)
BASOPHILS NFR BLD: 0 % (ref 0–1)
BILIRUB SERPL-MCNC: 0.5 MG/DL (ref 0.2–1)
BLOOD GROUP ANTIBODIES SERPL: NEGATIVE
BUN SERPL-MCNC: 22 MG/DL (ref 6–20)
BUN/CREAT SERPL: 29 (ref 12–20)
CA-I BLD-MCNC: 9.3 MG/DL (ref 8.5–10.1)
CHLORIDE SERPL-SCNC: 106 MMOL/L (ref 97–108)
CO2 SERPL-SCNC: 28 MMOL/L (ref 21–32)
CREAT SERPL-MCNC: 0.75 MG/DL (ref 0.55–1.02)
DIFFERENTIAL METHOD BLD: ABNORMAL
EOSINOPHIL # BLD: 0 K/UL (ref 0–0.4)
EOSINOPHIL NFR BLD: 0 % (ref 0–7)
ERYTHROCYTE [DISTWIDTH] IN BLOOD BY AUTOMATED COUNT: 13.3 % (ref 11.5–14.5)
GLOBULIN SER CALC-MCNC: 2.7 G/DL (ref 2–4)
GLUCOSE SERPL-MCNC: 117 MG/DL (ref 65–100)
HCT VFR BLD AUTO: 42.2 % (ref 35–47)
HGB BLD-MCNC: 14.1 G/DL (ref 11.5–16)
IMM GRANULOCYTES # BLD AUTO: 0 K/UL (ref 0–0.04)
IMM GRANULOCYTES NFR BLD AUTO: 0 % (ref 0–0.5)
INR PPP: 1.3 (ref 0.9–1.1)
LYMPHOCYTES # BLD: 1.6 K/UL (ref 0.8–3.5)
LYMPHOCYTES NFR BLD: 25 % (ref 12–49)
MCH RBC QN AUTO: 32.6 PG (ref 26–34)
MCHC RBC AUTO-ENTMCNC: 33.4 G/DL (ref 30–36.5)
MCV RBC AUTO: 97.7 FL (ref 80–99)
MONOCYTES # BLD: 0.8 K/UL (ref 0–1)
MONOCYTES NFR BLD: 13 % (ref 5–13)
NEUTS SEG # BLD: 3.9 K/UL (ref 1.8–8)
NEUTS SEG NFR BLD: 62 % (ref 32–75)
NRBC # BLD: 0 K/UL (ref 0–0.01)
NRBC BLD-RTO: 0 PER 100 WBC
PLATELET # BLD AUTO: 92 K/UL (ref 150–400)
PMV BLD AUTO: 11.8 FL (ref 8.9–12.9)
POTASSIUM SERPL-SCNC: 3.5 MMOL/L (ref 3.5–5.1)
PROT SERPL-MCNC: 6.4 G/DL (ref 6.4–8.2)
PROTHROMBIN TIME: 16.2 SEC (ref 11.9–14.7)
RBC # BLD AUTO: 4.32 M/UL (ref 3.8–5.2)
SODIUM SERPL-SCNC: 140 MMOL/L (ref 136–145)
SPECIMEN EXP DATE BLD: NORMAL
WBC # BLD AUTO: 6.4 K/UL (ref 3.6–11)

## 2021-12-23 PROCEDURE — 71045 X-RAY EXAM CHEST 1 VIEW: CPT

## 2021-12-23 PROCEDURE — 99285 EMERGENCY DEPT VISIT HI MDM: CPT

## 2021-12-23 PROCEDURE — 36415 COLL VENOUS BLD VENIPUNCTURE: CPT

## 2021-12-23 PROCEDURE — 80053 COMPREHEN METABOLIC PANEL: CPT

## 2021-12-23 PROCEDURE — 85610 PROTHROMBIN TIME: CPT

## 2021-12-23 PROCEDURE — 70450 CT HEAD/BRAIN W/O DYE: CPT

## 2021-12-23 PROCEDURE — 85025 COMPLETE CBC W/AUTO DIFF WBC: CPT

## 2021-12-23 PROCEDURE — 86901 BLOOD TYPING SEROLOGIC RH(D): CPT

## 2021-12-23 PROCEDURE — 72125 CT NECK SPINE W/O DYE: CPT

## 2021-12-23 PROCEDURE — 70486 CT MAXILLOFACIAL W/O DYE: CPT

## 2021-12-23 NOTE — ED PROVIDER NOTES
EMERGENCY DEPARTMENT HISTORY AND PHYSICAL EXAM      Date: 12/23/2021  Patient Name: Delano Frye    History of Presenting Illness     Chief Complaint   Patient presents with    Fall       HPI: Delano Frye, 67 y.o. female with a past medical history of hypertension, multiple adenoma, and PE on Xarelto compliant, as well as Klonopin and clonidine presenting for fall. The patient reportedly had a mechanical fall while she was going upstairs due to misplaced it. She fell down and hit the right side of the face. There was a reported few minutes episode of loss of consciousness without any seizure-like activity. She has had altered mental status and has not had some perseveration of speech that is slowly improving. The patient reports a headache as well as start facial pain and right eye pain. No vision changes. Denies any chest pain or shortness of breath. Denies abdominal pain, nausea, or vomiting. No other complaints. The patient presented as a trauma bravo. C collar in place. PCP: Jose Alejandro Cabrales MD    Current Outpatient Medications   Medication Sig Dispense Refill    potassium chloride (Klor-Con M20) 20 mEq tablet Take 20 mEq by mouth two (2) times a day.  cloNIDine (CATAPRES) 0.2 mg/24 hr ptwk 1 Patch by TransDERmal route every seven (7) days.  dexAMETHasone (DECADRON) 2 mg tablet Take 20 mg by mouth every seven (7) days.  ergocalciferol (Vitamin D2) 1,250 mcg (50,000 unit) capsule Take 50,000 Units by mouth daily.  calcium carbonate (OS-NICOLE) 500 mg calcium (1,250 mg) tablet Take  by mouth daily.  carvediloL (COREG) 12.5 mg tablet carvedilol 12.5 mg tablet   Take 1 tablet twice a day by oral route.  famotidine (PEPCID) 20 mg tablet famotidine 20 mg tablet   Take 1 tablet twice a day by oral route.  rivaroxaban (Xarelto) 10 mg tablet Take 20 mg by mouth daily (with breakfast).          Medical History   I reviewed the medical, surgical, family, and social history, as well as allergies:    Past Medical History:  Past Medical History:   Diagnosis Date    Arthritis     GERD (gastroesophageal reflux disease)     Hypertension     Immunosuppression due to drug therapy ([facility] [address]     Multiple myeloma (HCC)     Thromboembolus ([facility] [address]        Past Surgical History:  Past Surgical History:   Procedure Laterality Date    HX BONE MARROW TRANSPLANT      HX GYN      vaginal prolaps       Family History:  Family History   Problem Relation Age of Onset    Hypertension Mother     Hypertension Father        Social History:  Social History     Tobacco Use    Smoking status: Former Smoker    Smokeless tobacco: Never Used    Tobacco comment: quit 50 years ago   Substance Use Topics    Alcohol use: Never    Drug use: Never       Allergies: Allergies   Allergen Reactions    Decamethonium Unknown (comments)     She thinks it made her bleed       Review of Systems     Review of Systems   All other systems reviewed and are negative. Physical Exam and Vital Signs   Vital Signs - Reviewed the patient's vital signs. Patient Vitals for the past 12 hrs:   Temp Pulse Resp BP SpO2   12/23/21 1122     97 %   12/23/21 1118 98.3 °F (36.8 °C) 65 18 (!) 168/92 97 %   12/23/21 1107  93 18 (!) 172/102 99 %       Physical Exam:    PRIMARY SURVEY  GENERAL: awake, alert, cooperative, calm, not in distress  AIRWAY: Intact. C-spine precautions initiated. BREATHING: Equal bilateral air entry. CIRCULATION: Initial BP normal. Access secured via PIV. DISABILITY: GCS 15  EXPOSURE: Patient was fully exposed and examined for signs of trauma'    SECONDARY SURVEY  HEENT:  * PERRL, EOMI, right subconjunctival bleed, normal visual acuity. Normal pupils. Reactive bilaterally.   * No hemotynpanum, no raccoon eyes, no graves sign  * No fractured teeth  *Nasal bridge abrasion as well as small lip laceration over the mucosal aspect and subconjunctival bleed in the right eye tenderness over the right maxilla. * Oropharynx clear without bleeding  * No C-spine tenderness, C-collar in place  CV:  * regular rate   * +2 pulses in UE/LE bilaterally  PULMONARY: CTAB, no wheezes/crackles, good air movement  ABDOMEN: soft ND/NT  BACK: No midline spine tenderness, step offs, or deformities. : Normal appearing genitalia  EXTREMITIES: WWP, no tenderness, no edema, normal capillary refill  SKIN: no lacerations or abrasions. NEURO:  * Speech clear  * Moves U&LE to command      Medical Decision Making and ED Course   - I am the first and primary provider for this patient and am the primary provider of record. - I reviewed the vital signs, available nursing notes, past medical history, past surgical history, family history and social history. - Initial assessment performed. The patients presenting problems have been discussed, and the staff are in agreement with the care plan formulated and outlined with them. I have encouraged them to ask questions as they arise throughout their visit. - Available medical records, nursing notes, old EKGs, and EMS run sheets (if patient was EMS transported) were reviewed    MDM:   Patient is a 67 y.o. female presenting for wound level fall with head trauma and facial trauma on blood thinners with loss of consciousness and mental status change. Vitals reveal no abnormalities and physical exam reveals right  subconjunctival bleed, nose abrasion, and maxillary tenderness. Based on the history, physical exam, risk factors, and vitals signs, differential includes: ICH, concussion, soft tissue contusion, facial fracture, subconjunctival bleed. Dominic Rodas       Results     Labs:     Recent Results (from the past 12 hour(s))   CBC WITH AUTOMATED DIFF    Collection Time: 12/23/21 11:16 AM   Result Value Ref Range    WBC 6.4 3.6 - 11.0 K/uL    RBC 4.32 3.80 - 5.20 M/uL    HGB 14.1 11.5 - 16.0 g/dL    HCT 42.2 35.0 - 47.0 %    MCV 97.7 80.0 - 99.0 FL    MCH 32.6 26.0 - 34.0 PG    MCHC 33.4 30.0 - 36.5 g/dL RDW 13.3 11.5 - 14.5 %    PLATELET 92 (L) 317 - 400 K/uL    MPV 11.8 8.9 - 12.9 FL    NRBC 0.0 0.0  WBC    ABSOLUTE NRBC 0.00 0.00 - 0.01 K/uL    NEUTROPHILS 62 32 - 75 %    LYMPHOCYTES 25 12 - 49 %    MONOCYTES 13 5 - 13 %    EOSINOPHILS 0 0 - 7 %    BASOPHILS 0 0 - 1 %    IMMATURE GRANULOCYTES 0 0 - 0.5 %    ABS. NEUTROPHILS 3.9 1.8 - 8.0 K/UL    ABS. LYMPHOCYTES 1.6 0.8 - 3.5 K/UL    ABS. MONOCYTES 0.8 0.0 - 1.0 K/UL    ABS. EOSINOPHILS 0.0 0.0 - 0.4 K/UL    ABS. BASOPHILS 0.0 0.0 - 0.1 K/UL    ABS. IMM. GRANS. 0.0 0.00 - 0.04 K/UL    DF AUTOMATED     TYPE & SCREEN    Collection Time: 12/23/21 11:16 AM   Result Value Ref Range    Crossmatch Expiration 12/26/2021,2359     ABO/Rh(D) O Positive     Antibody screen Negative    PROTHROMBIN TIME + INR    Collection Time: 12/23/21 11:16 AM   Result Value Ref Range    Prothrombin time 16.2 (H) 11.9 - 14.7 sec    INR 1.3 (H) 0.9 - 1.1     METABOLIC PANEL, COMPREHENSIVE    Collection Time: 12/23/21 11:16 AM   Result Value Ref Range    Sodium 140 136 - 145 mmol/L    Potassium 3.5 3.5 - 5.1 mmol/L    Chloride 106 97 - 108 mmol/L    CO2 28 21 - 32 mmol/L    Anion gap 6 5 - 15 mmol/L    Glucose 117 (H) 65 - 100 mg/dL    BUN 22 (H) 6 - 20 mg/dL    Creatinine 0.75 0.55 - 1.02 mg/dL    BUN/Creatinine ratio 29 (H) 12 - 20      GFR est AA >60 >60 ml/min/1.73m2    GFR est non-AA >60 >60 ml/min/1.73m2    Calcium 9.3 8.5 - 10.1 mg/dL    Bilirubin, total 0.5 0.2 - 1.0 mg/dL    AST (SGOT) 32 15 - 37 U/L    ALT (SGPT) 41 12 - 78 U/L    Alk. phosphatase 76 45 - 117 U/L    Protein, total 6.4 6.4 - 8.2 g/dL    Albumin 3.7 3.5 - 5.0 g/dL    Globulin 2.7 2.0 - 4.0 g/dL    A-G Ratio 1.4 1.1 - 2.2         Radiologic Studies:  CT Results  (Last 48 hours)               12/23/21 1149  CT HEAD WO CONT Final result    Impression:      1. No acute intracranial abnormality identified. Narrative:  CT HEAD       INDICATION: 67years old Female.  fall, AMS, loss of conciousness. COMPARISON: CT facial bones performed same day, dictated separately. CONTRAST:   IV Contrast: None. Total Radiation DLP: 1802 mGy*cm   CT exam was performed using automated exposure control. FINDINGS:       There is no acute intraparenchymal or extra-axial hemorrhage. There is no mass effect or midline shift. Ventricles and basilar cisterns are within normal limits for age. There are scattered areas of periventricular and subcortical hypodensity   suggesting chronic small vessel ischemic change. There is preseptal and premaxillary soft tissue swelling on the right. The   patient appears to have had prior cataract surgery. There are some multiple small lytic foci in the visualized portions of the skull   and spine consistent with the clinical history of multiple myeloma. Small dural   calcifications are incidentally noted. The remainder of the visualized portions of the orbits, paranasal sinuses and   mastoid air cells are unremarkable. 12/23/21 1149  CT MAXILLOFACIAL WO CONT Final result    Impression:  1. No fractures or acute bony abnormality. 2. Significant right facial maternal/soft tissue swelling. Narrative: All CT scans at this facility are performed using dose reduction optimization   techniques as appropriate to the performed exam, including the following:   Automated exposure control, adjustment of the MA and/or KVP according to the   patient size, and the use of iterative reconstruction technique. Technique: 3 mm axial images were obtained through the entire facial bones. Sagittal and coronal reformations were performed. Comparisons: None. Findings: No fractures or acute bony abnormality evident. There is a right   frontal scalp hematoma. There is also preseptal edema on the right. No evidence   of injury to the globes or orbits. The paranasal sinuses are clear.            12/23/21 1149  CT SPINE CERV WO CONT Final result    Impression:  1. No acute fracture of the cervical spine. 2. Age-indeterminate compression deformities of T3 and T4. If the patient has   pain localizing to this location, and it would affect clinical management, MRI   could be considered for further evaluation to try to determine chronicity. Narrative:  Examination: CT cervical spine without contrast.       HISTORY: Fall with loss of consciousness. Technique: Transaxial computed tomographic images of the cervical spine were   obtained without intravenous contrast. Coronal and sagittal reconstructions were   created. Dose Reduction: All CT scans at this facility are performed using dose reduction   optimization techniques as appropriate to a performed exam including the   following: Automated exposure control, adjustments of the mA and/or kV according   to patient size, or use of iterative reconstruction technique. COMPARISON: None available. FINDINGS: There is accentuated cervical lordosis. There is no abnormal   listhesis. There is no facet subluxation or dislocation. The craniocervical   junction is normal. Vertebral body heights are normal. There is no acute   fracture of the cervical spine. There is mild height loss of the superior   endplate of T3. There is moderate height loss of T4. There is multilevel   degenerative disc disease, worst at C6-7. There is no prevertebral soft tissue   swelling. The visualized aortic arch is atherosclerotic. The thyroid is   heterogeneous with multiple subcentimeter low-attenuation nodules. There is   atherosclerotic calcification at the carotid bifurcations. CXR Results  (Last 48 hours)               12/23/21 1107  XR CHEST PORT Final result    Impression:  FINDINGS: IMPRESSION: Single frontal view of the chest. The chin obscures upper   thorax. Right port distal tip SVC/RA region. Grossly unchanged cardiomegaly. No vascular congestion or pulmonary edema. The lungs are hypoinflated. Left lower lung chronic bronchial thickening or   scarring. Platelike atelectasis left midlung. No sizable pleural effusion or   evident pneumothorax. No free air below the diaphragm. Multiple thoracic vertebral body compressions, not acute when compared to chest   CT 2010. Upper abdominal calcific atherosclerosis or tubing is incompletely imaged. Narrative:  Fall. Comparison chest x-ray 2010. Medications ordered:  Medications - No data to display     ED Course     ED Course:     ED Course as of 12/23/21 1253   Thu Dec 23, 2021   1130 Chest x-ray negative, no concern for pulmonary edema, pleural effusion, pneumothorax, or pneumonia. [SS]   1210 CBC does not show any evidence of acute process. Leukocytosis not present to suggest infection. Hemoglobin at baseline without evidence of acute anemia. Platelet count is normal.    Electrolytes are within range. Creatinine is not elevated more than baseline range making STANLEY unlikely. No significant transaminitis noted. Normal bilirubin. [SS]   7616 The non-contrasted head CT was negative for acute process making intracranial bleed unlikely. No evidence of large subacute stroke, ventriculomegaly, or masses. CT facial bones did not show any signs of facial bone fractures or dislocations. Swelling noted. [SS]   1098 Baseline mentation. It was discussed with the patient and the daughter that the patient may have a delayed bleed although testing in the ER today was reassuring. She will be staying with her daughter for the next 24 hours for monitoring for any signs of worsening confusion, nausea, vomiting, or headaches and return to the ER right away if any of the symptoms should develop. [SS]   1240 The C-spine shows T3 and T2 fractures however there is no tenderness to palpation, likely old fractures.  [SS]      ED Course User Index  [SS] Roseanne Wayne MD         Final Disposition     Disposition: Condition stable  DC- Adult Discharges: All of the diagnostic tests were reviewed and questions answered. Diagnosis, care plan and treatment options were discussed. The patient understands the instructions and will follow up as directed. The patients results have been reviewed with them. They have been counseled regarding their diagnosis. The patient verbally convey understanding and agreement of the signs, symptoms, diagnosis, treatment and prognosis and additionally agrees to follow up as recommended with their PCP in 24 - 48 hours. They also agree with the care-plan and convey that all of their questions have been answered. I have also put together some discharge instructions for them that include: 1) educational information regarding their diagnosis, 2) how to care for their diagnosis at home, as well a 3) list of reasons why they would want to return to the ED prior to their follow-up appointment, should their condition change. DISCHARGE PLAN:  1. Current Discharge Medication List      CONTINUE these medications which have NOT CHANGED    Details   potassium chloride (Klor-Con M20) 20 mEq tablet Take 20 mEq by mouth two (2) times a day. cloNIDine (CATAPRES) 0.2 mg/24 hr ptwk 1 Patch by TransDERmal route every seven (7) days. dexAMETHasone (DECADRON) 2 mg tablet Take 20 mg by mouth every seven (7) days. ergocalciferol (Vitamin D2) 1,250 mcg (50,000 unit) capsule Take 50,000 Units by mouth daily. calcium carbonate (OS-NICOLE) 500 mg calcium (1,250 mg) tablet Take  by mouth daily. carvediloL (COREG) 12.5 mg tablet carvedilol 12.5 mg tablet   Take 1 tablet twice a day by oral route. famotidine (PEPCID) 20 mg tablet famotidine 20 mg tablet   Take 1 tablet twice a day by oral route. rivaroxaban (Xarelto) 10 mg tablet Take 20 mg by mouth daily (with breakfast).            2.   Follow-up Information     Follow up With Specialties Details Why Contact Info    Lee Ann Haines MD Family Medicine Schedule an appointment as soon as possible for a visit in 2 days  [address]  [id number] [phone] [address] [facility] DEPT Emergency Medicine Go to  If symptoms worsen [address] [facility] Elio Landrum MD Orthopedic Surgery Schedule an appointment as soon as possible for a visit in 3 days for your spine fractures [place] [id number]  [phone]          3. Return to ED if worse   4. Current Discharge Medication List            ED Critical Care   and Critical Care  CRITICAL CARE NOTE :  11:30 AM    Critical care time is being documented due to the fulfillment of at least one of the following:    - Critical conditions: condition that acutely impairs one or more vital organ systems such that there is a high probability of imminent or life-threatening deterioration in condition. Examples are diagnoses including but not limited to Afib RVR, DKA, PE, Etc. .    - Critical interventions: an action whose failure to initiate would likely allow a sudden, clinically significant decline in the patient's condition. These include   Requirement of transfer or ICU admission   Contemplation or provision of tPA   Drip initiation (pressors, antiarrhythmics, heparin, etc.)   Antidotes given (narcan, charcoal, epi for anaphylaxis, etc..)   >=2L fluid bolus   >=3 Duonebs   >1 IV/IM doses of sedatives, antiepileptics, BP meds, rate control meds, adenosine.  Procedures that are suggestive of critical care: chest tubes, cardioversion, BiPAP, IO, etc..    Critical care time is documented based on continuous or non-continuous provision of care that includes face-to-face time, placing orders, chart review, documentation, discussion with consultants, discussion with family.  This time calculation is a best approximation and does not include time spent on CPR, EKG interpretation, central line placement, intubation, laceration repairs, and other separately billed procedures. Amount of Critical Care Time: 35minutes    Details of critical care provision is documented above. A general summary is listed below:    IMPENDING DETERIORATION -CNS  ASSOCIATED RISK FACTORS - CNS Decompensation  MANAGEMENT- Bedside Assessment  INTERPRETATION -  CT Scan  INTERVENTIONS - Neurologic interventions   TREATMENT RESPONSE -Stable  PERFORMED BY - Self    NOTES   :  During this entire length of time I was immediately available to the patient . Jus Travis MD    Diagnosis     Clinical Impression:   1. Other closed fracture of third thoracic vertebra, initial encounter ([facility] [address]    2. Contusion of soft tissue    3. Subconjunctival hemorrhage of right eye        Attestations:    Jus Travis MD    Please note that this dictation was completed with [employer], the computer voice recognition software. Quite often unanticipated grammatical, syntax, homophones, and other interpretive errors are inadvertently transcribed by the computer software. Please disregard these errors. Please excuse any errors that have escaped final proofreading. Thank you.

## 2021-12-23 NOTE — PROGRESS NOTES
Spiritual Care Assessment/Progress Note  [address]      NAME: Simón Davila      MRN: [id number]  AGE: 67 y.o. SEX: female  Congregational Affiliation: [employer]ar[employer]   Language: English     12/23/2021     Total Time (in minutes): 10     Spiritual Assessment begun in [facility] EMERGENCY DEPT through conversation with:         [x]Patient        [] Family    [] Friend(s)        Reason for Consult: Other (comment),Emergency Department visit ([facility])     Spiritual beliefs: (Please include comment if needed)     [] Identifies with a kedar tradition:         [] Supported by a kedar community:            [] Claims no spiritual orientation:           [] Seeking spiritual identity:                [] Adheres to an individual form of spirituality:           [x] Not able to assess:                           Identified resources for coping:      [] Prayer                               [] Music                  [] Guided Imagery     [] Family/friends                 [] Pet visits     [] Devotional reading                         [x] Unknown     [] Other:                                            Interventions offered during this visit: (See comments for more details)                Plan of Care:     [] Support spiritual and/or cultural needs    [] Support AMD and/or advance care planning process      [] Support grieving process   [] Coordinate Rites and/or Rituals    [] Coordination with community clergy   [] No spiritual needs identified at this time   [] Detailed Plan of Care below (See Comments)  [] Make referral to Music Therapy  [] Make referral to Pet Therapy     [] Make referral to Addiction services  [] Make referral to [facility]  [] Make referral to Spiritual Care Partner  [] No future visits requested        [x] Contact Spiritual Care for further referrals     Comments: The purpose of the visit was in response to a TRAUMA BRAVO on the patient.  The patient was being attended to by the med-team.  provided the ministry of presence and the support of spiritual care on the unit. [address] [facility] [place].    can be reached by calling the  at [facility]  [phone]

## 2021-12-23 NOTE — ED TRIAGE NOTES
Per ems pt was walking when fell, + LOC, pt is on blood thinner, does not remember falling, pt alert and oriented x4, pt in c collar on arrival.

## 2022-03-18 PROBLEM — L97.929 IDIOPATHIC CHRONIC VENOUS HYPERTENSION OF LEFT LOWER EXTREMITY WITH ULCER (HCC): Status: ACTIVE | Noted: 2021-06-18

## 2022-03-18 PROBLEM — I87.312 IDIOPATHIC CHRONIC VENOUS HYPERTENSION OF LEFT LOWER EXTREMITY WITH ULCER (HCC): Status: ACTIVE | Noted: 2021-06-18

## 2022-03-19 PROBLEM — R60.0 EDEMA, LEG: Status: ACTIVE | Noted: 2021-06-18

## 2023-08-02 ENCOUNTER — APPOINTMENT (OUTPATIENT)
Facility: HOSPITAL | Age: 78
DRG: 841 | End: 2023-08-02
Payer: MEDICARE

## 2023-08-02 ENCOUNTER — HOSPITAL ENCOUNTER (INPATIENT)
Facility: HOSPITAL | Age: 78
LOS: 3 days | Discharge: HOME HEALTH | DRG: 841 | End: 2023-08-05
Attending: EMERGENCY MEDICINE | Admitting: HOSPITALIST
Payer: MEDICARE

## 2023-08-02 DIAGNOSIS — R29.6 FREQUENT FALLS: ICD-10-CM

## 2023-08-02 DIAGNOSIS — D64.9 SYMPTOMATIC ANEMIA: Primary | ICD-10-CM

## 2023-08-02 LAB
ALBUMIN SERPL-MCNC: 3.5 G/DL (ref 3.5–5)
ALBUMIN/GLOB SERPL: 1.3 (ref 1.1–2.2)
ALP SERPL-CCNC: 102 U/L (ref 45–117)
ALT SERPL-CCNC: 43 U/L (ref 12–78)
ANION GAP SERPL CALC-SCNC: 8 MMOL/L (ref 5–15)
AST SERPL W P-5'-P-CCNC: ABNORMAL U/L (ref 15–37)
BASOPHILS # BLD: 0 K/UL (ref 0–0.1)
BASOPHILS NFR BLD: 0 % (ref 0–1)
BILIRUB SERPL-MCNC: 0.4 MG/DL (ref 0.2–1)
BUN SERPL-MCNC: 25 MG/DL (ref 6–20)
BUN/CREAT SERPL: 27 (ref 12–20)
CA-I BLD-MCNC: 9.6 MG/DL (ref 8.5–10.1)
CHLORIDE SERPL-SCNC: 110 MMOL/L (ref 97–108)
CO2 SERPL-SCNC: 27 MMOL/L (ref 21–32)
CREAT SERPL-MCNC: 0.91 MG/DL (ref 0.55–1.02)
DIFFERENTIAL METHOD BLD: ABNORMAL
EOSINOPHIL # BLD: 0 K/UL (ref 0–0.4)
EOSINOPHIL NFR BLD: 0 % (ref 0–7)
ERYTHROCYTE [DISTWIDTH] IN BLOOD BY AUTOMATED COUNT: 17.2 % (ref 11.5–14.5)
GLOBULIN SER CALC-MCNC: 2.7 G/DL (ref 2–4)
GLUCOSE SERPL-MCNC: 121 MG/DL (ref 65–100)
HCT VFR BLD AUTO: 22.8 % (ref 35–47)
HCT VFR BLD AUTO: 24.7 % (ref 35–47)
HGB BLD-MCNC: 7.1 G/DL (ref 11.5–16)
HGB BLD-MCNC: 8 G/DL (ref 11.5–16)
IMM GRANULOCYTES # BLD AUTO: 0 K/UL
IMM GRANULOCYTES NFR BLD AUTO: 0 %
LYMPHOCYTES # BLD: 6.4 K/UL (ref 0.8–3.5)
LYMPHOCYTES NFR BLD: 57 % (ref 12–49)
MCH RBC QN AUTO: 34 PG (ref 26–34)
MCHC RBC AUTO-ENTMCNC: 31.1 G/DL (ref 30–36.5)
MCV RBC AUTO: 109.1 FL (ref 80–99)
MONOCYTES # BLD: 0.5 K/UL (ref 0–1)
MONOCYTES NFR BLD: 4 % (ref 5–13)
NEUTS BAND NFR BLD MANUAL: 2 % (ref 0–6)
NEUTS SEG # BLD: 4.2 K/UL (ref 1.8–8)
NEUTS SEG NFR BLD: 35 % (ref 32–75)
NRBC # BLD: 0.09 K/UL (ref 0–0.01)
NRBC BLD-RTO: 0.8 PER 100 WBC
OTHER CELLS NFR BLD MANUAL: 2 %
PLATELET # BLD AUTO: 89 K/UL (ref 150–400)
PMV BLD AUTO: 12.9 FL (ref 8.9–12.9)
POTASSIUM SERPL-SCNC: 3.6 MMOL/L (ref 3.5–5.1)
POTASSIUM SERPL-SCNC: ABNORMAL MMOL/L (ref 3.5–5.1)
PROT SERPL-MCNC: 6.2 G/DL (ref 6.4–8.2)
RBC # BLD AUTO: 2.09 M/UL (ref 3.8–5.2)
RBC MORPH BLD: ABNORMAL
RBC MORPH BLD: ABNORMAL
SODIUM SERPL-SCNC: 145 MMOL/L (ref 136–145)
TROPONIN I SERPL HS-MCNC: 15 NG/L (ref 0–51)
WBC # BLD AUTO: 11.3 K/UL (ref 3.6–11)

## 2023-08-02 PROCEDURE — 86850 RBC ANTIBODY SCREEN: CPT

## 2023-08-02 PROCEDURE — P9016 RBC LEUKOCYTES REDUCED: HCPCS

## 2023-08-02 PROCEDURE — 36415 COLL VENOUS BLD VENIPUNCTURE: CPT

## 2023-08-02 PROCEDURE — 71275 CT ANGIOGRAPHY CHEST: CPT

## 2023-08-02 PROCEDURE — 6370000000 HC RX 637 (ALT 250 FOR IP): Performed by: HOSPITALIST

## 2023-08-02 PROCEDURE — 85018 HEMOGLOBIN: CPT

## 2023-08-02 PROCEDURE — 2580000003 HC RX 258: Performed by: HOSPITALIST

## 2023-08-02 PROCEDURE — 72131 CT LUMBAR SPINE W/O DYE: CPT

## 2023-08-02 PROCEDURE — 85025 COMPLETE CBC W/AUTO DIFF WBC: CPT

## 2023-08-02 PROCEDURE — 84132 ASSAY OF SERUM POTASSIUM: CPT

## 2023-08-02 PROCEDURE — 86923 COMPATIBILITY TEST ELECTRIC: CPT

## 2023-08-02 PROCEDURE — 6360000002 HC RX W HCPCS: Performed by: HOSPITALIST

## 2023-08-02 PROCEDURE — 86901 BLOOD TYPING SEROLOGIC RH(D): CPT

## 2023-08-02 PROCEDURE — 70450 CT HEAD/BRAIN W/O DYE: CPT

## 2023-08-02 PROCEDURE — 99285 EMERGENCY DEPT VISIT HI MDM: CPT

## 2023-08-02 PROCEDURE — 86900 BLOOD TYPING SEROLOGIC ABO: CPT

## 2023-08-02 PROCEDURE — 85014 HEMATOCRIT: CPT

## 2023-08-02 PROCEDURE — 1100000000 HC RM PRIVATE

## 2023-08-02 PROCEDURE — 80053 COMPREHEN METABOLIC PANEL: CPT

## 2023-08-02 PROCEDURE — 6360000004 HC RX CONTRAST MEDICATION: Performed by: EMERGENCY MEDICINE

## 2023-08-02 PROCEDURE — 84484 ASSAY OF TROPONIN QUANT: CPT

## 2023-08-02 RX ORDER — ONDANSETRON 2 MG/ML
4 INJECTION INTRAMUSCULAR; INTRAVENOUS EVERY 6 HOURS PRN
Status: DISCONTINUED | OUTPATIENT
Start: 2023-08-02 | End: 2023-08-06 | Stop reason: HOSPADM

## 2023-08-02 RX ORDER — SODIUM CHLORIDE 0.9 % (FLUSH) 0.9 %
5-40 SYRINGE (ML) INJECTION EVERY 12 HOURS SCHEDULED
Status: DISCONTINUED | OUTPATIENT
Start: 2023-08-02 | End: 2023-08-06 | Stop reason: HOSPADM

## 2023-08-02 RX ORDER — SODIUM CHLORIDE 0.9 % (FLUSH) 0.9 %
5-40 SYRINGE (ML) INJECTION PRN
Status: DISCONTINUED | OUTPATIENT
Start: 2023-08-02 | End: 2023-08-06 | Stop reason: HOSPADM

## 2023-08-02 RX ORDER — ALLOPURINOL 300 MG/1
300 TABLET ORAL DAILY
Status: DISCONTINUED | OUTPATIENT
Start: 2023-08-02 | End: 2023-08-06 | Stop reason: HOSPADM

## 2023-08-02 RX ORDER — OXYCODONE HYDROCHLORIDE 5 MG/1
5 TABLET ORAL EVERY 6 HOURS PRN
Status: DISCONTINUED | OUTPATIENT
Start: 2023-08-02 | End: 2023-08-06 | Stop reason: HOSPADM

## 2023-08-02 RX ORDER — DEXAMETHASONE 4 MG/1
20 TABLET ORAL
Status: DISCONTINUED | OUTPATIENT
Start: 2023-08-02 | End: 2023-08-06 | Stop reason: HOSPADM

## 2023-08-02 RX ORDER — CLONIDINE HYDROCHLORIDE 0.1 MG/1
0.2 TABLET ORAL DAILY
Status: DISCONTINUED | OUTPATIENT
Start: 2023-08-02 | End: 2023-08-06 | Stop reason: HOSPADM

## 2023-08-02 RX ORDER — RIVAROXABAN 10 MG/1
10 TABLET, FILM COATED ORAL EVERY EVENING
Status: ON HOLD | COMMUNITY
Start: 2023-06-30 | End: 2023-08-05 | Stop reason: HOSPADM

## 2023-08-02 RX ORDER — LANOLIN ALCOHOL/MO/W.PET/CERES
3 CREAM (GRAM) TOPICAL NIGHTLY PRN
Status: DISCONTINUED | OUTPATIENT
Start: 2023-08-02 | End: 2023-08-06 | Stop reason: HOSPADM

## 2023-08-02 RX ORDER — POLYETHYLENE GLYCOL 3350 17 G/17G
17 POWDER, FOR SOLUTION ORAL DAILY PRN
Status: DISCONTINUED | OUTPATIENT
Start: 2023-08-02 | End: 2023-08-06 | Stop reason: HOSPADM

## 2023-08-02 RX ORDER — ALLOPURINOL 300 MG/1
TABLET ORAL
COMMUNITY
Start: 2023-04-26

## 2023-08-02 RX ORDER — ACETAMINOPHEN 325 MG/1
650 TABLET ORAL EVERY 6 HOURS PRN
Status: DISCONTINUED | OUTPATIENT
Start: 2023-08-02 | End: 2023-08-06 | Stop reason: HOSPADM

## 2023-08-02 RX ORDER — SODIUM CHLORIDE 9 MG/ML
INJECTION, SOLUTION INTRAVENOUS PRN
Status: DISCONTINUED | OUTPATIENT
Start: 2023-08-02 | End: 2023-08-05

## 2023-08-02 RX ORDER — DEXAMETHASONE 4 MG/1
20 TABLET ORAL
COMMUNITY
Start: 2023-05-26

## 2023-08-02 RX ORDER — ALPRAZOLAM 0.5 MG/1
0.5 TABLET ORAL 2 TIMES DAILY PRN
Status: DISCONTINUED | OUTPATIENT
Start: 2023-08-02 | End: 2023-08-03

## 2023-08-02 RX ORDER — SENNA AND DOCUSATE SODIUM 50; 8.6 MG/1; MG/1
2 TABLET, FILM COATED ORAL 2 TIMES DAILY
Status: DISCONTINUED | OUTPATIENT
Start: 2023-08-02 | End: 2023-08-06 | Stop reason: HOSPADM

## 2023-08-02 RX ORDER — ACETAMINOPHEN 650 MG/1
650 SUPPOSITORY RECTAL EVERY 6 HOURS PRN
Status: DISCONTINUED | OUTPATIENT
Start: 2023-08-02 | End: 2023-08-06 | Stop reason: HOSPADM

## 2023-08-02 RX ORDER — SODIUM CHLORIDE 9 MG/ML
INJECTION, SOLUTION INTRAVENOUS PRN
Status: DISCONTINUED | OUTPATIENT
Start: 2023-08-02 | End: 2023-08-06 | Stop reason: HOSPADM

## 2023-08-02 RX ORDER — POTASSIUM CHLORIDE 1500 MG/1
40 TABLET, EXTENDED RELEASE ORAL 2 TIMES DAILY
Status: ON HOLD | COMMUNITY
Start: 2023-04-25 | End: 2023-08-05 | Stop reason: HOSPADM

## 2023-08-02 RX ORDER — ALPRAZOLAM 0.5 MG/1
1 TABLET ORAL 3 TIMES DAILY PRN
COMMUNITY

## 2023-08-02 RX ORDER — CARVEDILOL 12.5 MG/1
12.5 TABLET ORAL 2 TIMES DAILY
Status: DISCONTINUED | OUTPATIENT
Start: 2023-08-02 | End: 2023-08-06 | Stop reason: HOSPADM

## 2023-08-02 RX ORDER — CARVEDILOL 12.5 MG/1
TABLET ORAL
COMMUNITY

## 2023-08-02 RX ORDER — ONDANSETRON 4 MG/1
4 TABLET, ORALLY DISINTEGRATING ORAL EVERY 8 HOURS PRN
Status: DISCONTINUED | OUTPATIENT
Start: 2023-08-02 | End: 2023-08-06 | Stop reason: HOSPADM

## 2023-08-02 RX ORDER — HYDRALAZINE HYDROCHLORIDE 20 MG/ML
10 INJECTION INTRAMUSCULAR; INTRAVENOUS EVERY 6 HOURS PRN
Status: DISCONTINUED | OUTPATIENT
Start: 2023-08-02 | End: 2023-08-06 | Stop reason: HOSPADM

## 2023-08-02 RX ORDER — POTASSIUM CHLORIDE 20 MEQ/1
40 TABLET, EXTENDED RELEASE ORAL 2 TIMES DAILY
Status: DISCONTINUED | OUTPATIENT
Start: 2023-08-02 | End: 2023-08-06 | Stop reason: HOSPADM

## 2023-08-02 RX ORDER — IBUPROFEN 200 MG
CAPSULE ORAL DAILY
COMMUNITY

## 2023-08-02 RX ORDER — CLONIDINE HYDROCHLORIDE 0.2 MG/1
0.2 TABLET ORAL DAILY
Status: ON HOLD | COMMUNITY
Start: 2023-07-26 | End: 2023-08-05 | Stop reason: HOSPADM

## 2023-08-02 RX ADMIN — ALLOPURINOL 300 MG: 300 TABLET ORAL at 18:27

## 2023-08-02 RX ADMIN — SENNOSIDES AND DOCUSATE SODIUM 2 TABLET: 50; 8.6 TABLET ORAL at 22:15

## 2023-08-02 RX ADMIN — IOPAMIDOL 100 ML: 755 INJECTION, SOLUTION INTRAVENOUS at 15:13

## 2023-08-02 RX ADMIN — POTASSIUM CHLORIDE 40 MEQ: 1500 TABLET, EXTENDED RELEASE ORAL at 22:16

## 2023-08-02 RX ADMIN — OXYCODONE HYDROCHLORIDE 5 MG: 5 TABLET ORAL at 19:27

## 2023-08-02 RX ADMIN — DEXAMETHASONE 20 MG: 4 TABLET ORAL at 18:27

## 2023-08-02 RX ADMIN — CARVEDILOL 12.5 MG: 12.5 TABLET, FILM COATED ORAL at 22:16

## 2023-08-02 RX ADMIN — SODIUM CHLORIDE, PRESERVATIVE FREE 10 ML: 5 INJECTION INTRAVENOUS at 22:16

## 2023-08-02 ASSESSMENT — PAIN DESCRIPTION - ORIENTATION: ORIENTATION: UPPER

## 2023-08-02 ASSESSMENT — PAIN DESCRIPTION - LOCATION
LOCATION: BACK
LOCATION: BACK;RIB CAGE
LOCATION: BACK;RIB CAGE

## 2023-08-02 ASSESSMENT — PAIN SCALES - GENERAL
PAINLEVEL_OUTOF10: 5
PAINLEVEL_OUTOF10: 5
PAINLEVEL_OUTOF10: 4

## 2023-08-02 ASSESSMENT — LIFESTYLE VARIABLES
HOW MANY STANDARD DRINKS CONTAINING ALCOHOL DO YOU HAVE ON A TYPICAL DAY: PATIENT DOES NOT DRINK
HOW OFTEN DO YOU HAVE A DRINK CONTAINING ALCOHOL: NEVER

## 2023-08-02 ASSESSMENT — PAIN DESCRIPTION - DESCRIPTORS: DESCRIPTORS: ACHING;DISCOMFORT

## 2023-08-02 ASSESSMENT — PAIN - FUNCTIONAL ASSESSMENT: PAIN_FUNCTIONAL_ASSESSMENT: 0-10

## 2023-08-02 ASSESSMENT — PAIN DESCRIPTION - FREQUENCY: FREQUENCY: CONTINUOUS

## 2023-08-02 NOTE — ED NOTES
Ct called stating that pt needed an above the wrist iv for contrast. Started new iv in ct for pt.  Pt still in ct at this time     Goldie Hou RN  08/02/23 1297

## 2023-08-02 NOTE — ED TRIAGE NOTES
Pt sent to ER by PCP/ Oncologist for xrays of chest, side, and back, concern for broken rib. Hgb 6.8 - recent falls/ weakness per [facility].

## 2023-08-02 NOTE — H&P
Hospitalist Admission Note    NAME:  Edmund Wallace   :  1945   MRN:  [id number]     Date/Time:  2023 5:49 PM    Patient PCP: Denisha Andrade MD    Please note that this dictation was completed with [employer], the computer voice recognition software. Quite often unanticipated grammatical, syntax, homophones, and other interpretive errors are inadvertently transcribed by the computer software. Please disregard these errors. Please excuse any errors that have escaped final proofreading  ______________________________________________________________________  Given the patient's current clinical presentation, I have a high level of concern for decompensation if discharged from the emergency department. Complex decision making was performed, which includes reviewing the patient's available past medical records, laboratory results, and x-ray films. My assessment of this patient's clinical condition and my plan of care is as follows. Assessment / Plan: Active Problems and Plan:    Macrocytic anemia hgb 6.8 at oncologist's office, 7.1 here, baseline unknown  Likely from multiple myeloma relapse  --refused rectal exam; ordered stool guaiac x 3  --transfused 1 unit in ER, recheck H&H  --oncology consult  --hold xarelto for now    Hx DVT  --hold xarelto given anemia and potential need for kyphoplasty     Mid and lower back pain with mild lumbar compression fractures on CT from osteoporosis and myeloma and multiple thoracic deformities with kyphosis on CTA chest  Mild lumbar stenosis L4-L5  --get MRI thoracic and lumbar spine, consider kyphoplasty    Multiple myeloma, currently in relapse?  --had BM bx 2 weeks ago.   In remission for 11 years per patient but her med list stated was on pomalidomide in   --onc consult  --she seems to have poor insight into her disease, unable to provide much information    Recent fall  Generalized weakness  --consult pt/ot  --fall precaution  --may need SNF at

## 2023-08-02 NOTE — ED NOTES
Patient refused rectal exam. MD educated patient on importance of this exam to figure out how to better help medically treat her     Brigitte Chayito, [address]  08/02/23 3920

## 2023-08-03 ENCOUNTER — HOSPITAL ENCOUNTER (INPATIENT)
Facility: HOSPITAL | Age: 78
Discharge: HOME | DRG: 841 | End: 2023-08-06
Payer: MEDICARE

## 2023-08-03 LAB
ANION GAP SERPL CALC-SCNC: 6 MMOL/L (ref 5–15)
BASOPHILS # BLD: 0.1 K/UL (ref 0–0.1)
BASOPHILS NFR BLD: 1 % (ref 0–1)
BUN SERPL-MCNC: 21 MG/DL (ref 6–20)
BUN/CREAT SERPL: 28 (ref 12–20)
CA-I BLD-MCNC: 9.7 MG/DL (ref 8.5–10.1)
CHLORIDE SERPL-SCNC: 112 MMOL/L (ref 97–108)
CO2 SERPL-SCNC: 25 MMOL/L (ref 21–32)
CREAT SERPL-MCNC: 0.74 MG/DL (ref 0.55–1.02)
DIFFERENTIAL METHOD BLD: ABNORMAL
EOSINOPHIL # BLD: 0 K/UL (ref 0–0.4)
EOSINOPHIL NFR BLD: 0 % (ref 0–7)
ERYTHROCYTE [DISTWIDTH] IN BLOOD BY AUTOMATED COUNT: 19.1 % (ref 11.5–14.5)
GLUCOSE SERPL-MCNC: 142 MG/DL (ref 65–100)
HCT VFR BLD AUTO: 27.3 % (ref 35–47)
HGB BLD-MCNC: 8.9 G/DL (ref 11.5–16)
IMM GRANULOCYTES # BLD AUTO: 0 K/UL
IMM GRANULOCYTES NFR BLD AUTO: 0 %
LYMPHOCYTES # BLD: 6.9 K/UL (ref 0.8–3.5)
LYMPHOCYTES NFR BLD: 49 % (ref 12–49)
MCH RBC QN AUTO: 33.3 PG (ref 26–34)
MCHC RBC AUTO-ENTMCNC: 32.6 G/DL (ref 30–36.5)
MCV RBC AUTO: 102.2 FL (ref 80–99)
MONOCYTES # BLD: 1 K/UL (ref 0–1)
MONOCYTES NFR BLD: 7 % (ref 5–13)
NEUTS BAND NFR BLD MANUAL: 5 % (ref 0–6)
NEUTS SEG # BLD: 5.5 K/UL (ref 1.8–8)
NEUTS SEG NFR BLD: 34 % (ref 32–75)
NRBC # BLD: 0.06 K/UL (ref 0–0.01)
NRBC BLD-RTO: 0.4 PER 100 WBC
OTHER CELLS NFR BLD MANUAL: 4 %
PLATELET # BLD AUTO: 88 K/UL (ref 150–400)
PMV BLD AUTO: 12.1 FL (ref 8.9–12.9)
POTASSIUM SERPL-SCNC: 4.6 MMOL/L (ref 3.5–5.1)
RBC # BLD AUTO: 2.67 M/UL (ref 3.8–5.2)
RBC MORPH BLD: ABNORMAL
SODIUM SERPL-SCNC: 143 MMOL/L (ref 136–145)
WBC # BLD AUTO: 14 K/UL (ref 3.6–11)

## 2023-08-03 PROCEDURE — 72158 MRI LUMBAR SPINE W/O & W/DYE: CPT

## 2023-08-03 PROCEDURE — A9577 INJ MULTIHANCE: HCPCS | Performed by: FAMILY MEDICINE

## 2023-08-03 PROCEDURE — 1100000000 HC RM PRIVATE

## 2023-08-03 PROCEDURE — 6370000000 HC RX 637 (ALT 250 FOR IP): Performed by: HOSPITALIST

## 2023-08-03 PROCEDURE — 6360000004 HC RX CONTRAST MEDICATION: Performed by: FAMILY MEDICINE

## 2023-08-03 PROCEDURE — 80048 BASIC METABOLIC PNL TOTAL CA: CPT

## 2023-08-03 PROCEDURE — 85025 COMPLETE CBC W/AUTO DIFF WBC: CPT

## 2023-08-03 PROCEDURE — 36415 COLL VENOUS BLD VENIPUNCTURE: CPT

## 2023-08-03 PROCEDURE — 2580000003 HC RX 258: Performed by: HOSPITALIST

## 2023-08-03 PROCEDURE — 72157 MRI CHEST SPINE W/O & W/DYE: CPT

## 2023-08-03 RX ORDER — ALPRAZOLAM 1 MG/1
1 TABLET ORAL ONCE
Status: COMPLETED | OUTPATIENT
Start: 2023-08-03 | End: 2023-08-03

## 2023-08-03 RX ORDER — ALPRAZOLAM 0.5 MG/1
0.5 TABLET ORAL 3 TIMES DAILY PRN
Status: DISCONTINUED | OUTPATIENT
Start: 2023-08-03 | End: 2023-08-06 | Stop reason: HOSPADM

## 2023-08-03 RX ADMIN — OXYCODONE HYDROCHLORIDE 5 MG: 5 TABLET ORAL at 11:50

## 2023-08-03 RX ADMIN — POTASSIUM CHLORIDE 40 MEQ: 1500 TABLET, EXTENDED RELEASE ORAL at 11:47

## 2023-08-03 RX ADMIN — ALPRAZOLAM 1 MG: 1 TABLET ORAL at 13:19

## 2023-08-03 RX ADMIN — POTASSIUM CHLORIDE 40 MEQ: 1500 TABLET, EXTENDED RELEASE ORAL at 20:58

## 2023-08-03 RX ADMIN — CLONIDINE HYDROCHLORIDE 0.2 MG: 0.1 TABLET ORAL at 11:49

## 2023-08-03 RX ADMIN — CARVEDILOL 12.5 MG: 12.5 TABLET, FILM COATED ORAL at 11:48

## 2023-08-03 RX ADMIN — GADOBENATE DIMEGLUMINE 10 ML: 529 INJECTION, SOLUTION INTRAVENOUS at 15:20

## 2023-08-03 RX ADMIN — SENNOSIDES AND DOCUSATE SODIUM 2 TABLET: 50; 8.6 TABLET ORAL at 11:50

## 2023-08-03 RX ADMIN — OXYCODONE HYDROCHLORIDE 5 MG: 5 TABLET ORAL at 21:08

## 2023-08-03 RX ADMIN — ALLOPURINOL 300 MG: 300 TABLET ORAL at 11:48

## 2023-08-03 RX ADMIN — SODIUM CHLORIDE, PRESERVATIVE FREE 10 ML: 5 INJECTION INTRAVENOUS at 21:10

## 2023-08-03 RX ADMIN — SODIUM CHLORIDE, PRESERVATIVE FREE 10 ML: 5 INJECTION INTRAVENOUS at 11:51

## 2023-08-03 ASSESSMENT — PAIN DESCRIPTION - LOCATION
LOCATION: BACK
LOCATION: BACK

## 2023-08-03 ASSESSMENT — PAIN SCALES - GENERAL
PAINLEVEL_OUTOF10: 6
PAINLEVEL_OUTOF10: 0
PAINLEVEL_OUTOF10: 6

## 2023-08-03 ASSESSMENT — PAIN DESCRIPTION - DESCRIPTORS: DESCRIPTORS: ACHING;DISCOMFORT

## 2023-08-03 ASSESSMENT — PAIN DESCRIPTION - ONSET: ONSET: OTHER (COMMENT)

## 2023-08-03 ASSESSMENT — PAIN DESCRIPTION - ORIENTATION
ORIENTATION: UPPER
ORIENTATION: UPPER;LOWER;MID

## 2023-08-03 ASSESSMENT — PAIN - FUNCTIONAL ASSESSMENT: PAIN_FUNCTIONAL_ASSESSMENT: PREVENTS OR INTERFERES SOME ACTIVE ACTIVITIES AND ADLS

## 2023-08-04 LAB
ALBUMIN SERPL-MCNC: 3.1 G/DL (ref 3.5–5)
ANION GAP SERPL CALC-SCNC: 3 MMOL/L (ref 5–15)
BASOPHILS # BLD: 0 K/UL (ref 0–0.1)
BASOPHILS NFR BLD: 0 % (ref 0–1)
BUN SERPL-MCNC: 30 MG/DL (ref 6–20)
BUN/CREAT SERPL: 38 (ref 12–20)
CA-I BLD-MCNC: 9.6 MG/DL (ref 8.5–10.1)
CHLORIDE SERPL-SCNC: 112 MMOL/L (ref 97–108)
CO2 SERPL-SCNC: 28 MMOL/L (ref 21–32)
COLLECT DATE STL: NORMAL
CREAT SERPL-MCNC: 0.79 MG/DL (ref 0.55–1.02)
DIFFERENTIAL METHOD BLD: ABNORMAL
EOSINOPHIL # BLD: 0 K/UL (ref 0–0.4)
EOSINOPHIL NFR BLD: 0 % (ref 0–7)
ERYTHROCYTE [DISTWIDTH] IN BLOOD BY AUTOMATED COUNT: 18.6 % (ref 11.5–14.5)
GLUCOSE SERPL-MCNC: 87 MG/DL (ref 65–100)
HCT VFR BLD AUTO: 24.9 % (ref 35–47)
HCT VFR BLD AUTO: 26.3 % (ref 35–47)
HEMOCCULT SP1 STL QL: NEGATIVE
HGB BLD-MCNC: 7.8 G/DL (ref 11.5–16)
HGB BLD-MCNC: 8.3 G/DL (ref 11.5–16)
IMM GRANULOCYTES # BLD AUTO: 0 K/UL
IMM GRANULOCYTES NFR BLD AUTO: 0 %
LYMPHOCYTES # BLD: 3.4 K/UL (ref 0.8–3.5)
LYMPHOCYTES NFR BLD: 43 % (ref 12–49)
MAGNESIUM SERPL-MCNC: 2.4 MG/DL (ref 1.6–2.4)
MCH RBC QN AUTO: 33.1 PG (ref 26–34)
MCHC RBC AUTO-ENTMCNC: 31.3 G/DL (ref 30–36.5)
MCV RBC AUTO: 105.5 FL (ref 80–99)
MONOCYTES # BLD: 1.7 K/UL (ref 0–1)
MONOCYTES NFR BLD: 21 % (ref 5–13)
NEUTS BAND NFR BLD MANUAL: 4 % (ref 0–6)
NEUTS SEG # BLD: 2.8 K/UL (ref 1.8–8)
NEUTS SEG NFR BLD: 32 % (ref 32–75)
NRBC # BLD: 0.08 K/UL (ref 0–0.01)
NRBC BLD-RTO: 1 PER 100 WBC
PHOSPHATE SERPL-MCNC: 3.9 MG/DL (ref 2.6–4.7)
PLATELET # BLD AUTO: 83 K/UL (ref 150–400)
PLATELET COMMENT: ABNORMAL
PMV BLD AUTO: 12.6 FL (ref 8.9–12.9)
POTASSIUM SERPL-SCNC: 4.4 MMOL/L (ref 3.5–5.1)
RBC # BLD AUTO: 2.36 M/UL (ref 3.8–5.2)
RBC MORPH BLD: ABNORMAL
SODIUM SERPL-SCNC: 143 MMOL/L (ref 136–145)
WBC # BLD AUTO: 7.9 K/UL (ref 3.6–11)

## 2023-08-04 PROCEDURE — 6370000000 HC RX 637 (ALT 250 FOR IP): Performed by: HOSPITALIST

## 2023-08-04 PROCEDURE — 85025 COMPLETE CBC W/AUTO DIFF WBC: CPT

## 2023-08-04 PROCEDURE — 36415 COLL VENOUS BLD VENIPUNCTURE: CPT

## 2023-08-04 PROCEDURE — 97161 PT EVAL LOW COMPLEX 20 MIN: CPT

## 2023-08-04 PROCEDURE — 85014 HEMATOCRIT: CPT

## 2023-08-04 PROCEDURE — 85018 HEMOGLOBIN: CPT

## 2023-08-04 PROCEDURE — 82272 OCCULT BLD FECES 1-3 TESTS: CPT

## 2023-08-04 PROCEDURE — 97165 OT EVAL LOW COMPLEX 30 MIN: CPT

## 2023-08-04 PROCEDURE — 2580000003 HC RX 258: Performed by: HOSPITALIST

## 2023-08-04 PROCEDURE — 83521 IG LIGHT CHAINS FREE EACH: CPT

## 2023-08-04 PROCEDURE — 80069 RENAL FUNCTION PANEL: CPT

## 2023-08-04 PROCEDURE — 97530 THERAPEUTIC ACTIVITIES: CPT

## 2023-08-04 PROCEDURE — 1100000000 HC RM PRIVATE

## 2023-08-04 PROCEDURE — 83735 ASSAY OF MAGNESIUM: CPT

## 2023-08-04 RX ORDER — BISACODYL 5 MG/1
5 TABLET, DELAYED RELEASE ORAL DAILY PRN
Status: DISCONTINUED | OUTPATIENT
Start: 2023-08-04 | End: 2023-08-06 | Stop reason: HOSPADM

## 2023-08-04 RX ADMIN — SODIUM CHLORIDE, PRESERVATIVE FREE 10 ML: 5 INJECTION INTRAVENOUS at 20:15

## 2023-08-04 RX ADMIN — ALLOPURINOL 300 MG: 300 TABLET ORAL at 09:58

## 2023-08-04 RX ADMIN — OXYCODONE HYDROCHLORIDE 5 MG: 5 TABLET ORAL at 10:26

## 2023-08-04 RX ADMIN — SODIUM CHLORIDE, PRESERVATIVE FREE 10 ML: 5 INJECTION INTRAVENOUS at 09:59

## 2023-08-04 RX ADMIN — CARVEDILOL 12.5 MG: 12.5 TABLET, FILM COATED ORAL at 20:14

## 2023-08-04 RX ADMIN — POTASSIUM CHLORIDE 40 MEQ: 1500 TABLET, EXTENDED RELEASE ORAL at 09:57

## 2023-08-04 RX ADMIN — CARVEDILOL 12.5 MG: 12.5 TABLET, FILM COATED ORAL at 09:58

## 2023-08-04 RX ADMIN — SENNOSIDES AND DOCUSATE SODIUM 2 TABLET: 50; 8.6 TABLET ORAL at 09:58

## 2023-08-04 RX ADMIN — OXYCODONE HYDROCHLORIDE 5 MG: 5 TABLET ORAL at 20:14

## 2023-08-04 RX ADMIN — ALPRAZOLAM 0.5 MG: 0.5 TABLET ORAL at 22:14

## 2023-08-04 ASSESSMENT — PAIN SCALES - GENERAL
PAINLEVEL_OUTOF10: 2
PAINLEVEL_OUTOF10: 1
PAINLEVEL_OUTOF10: 0
PAINLEVEL_OUTOF10: 6
PAINLEVEL_OUTOF10: 6

## 2023-08-04 ASSESSMENT — PAIN - FUNCTIONAL ASSESSMENT: PAIN_FUNCTIONAL_ASSESSMENT: PREVENTS OR INTERFERES SOME ACTIVE ACTIVITIES AND ADLS

## 2023-08-04 ASSESSMENT — PAIN DESCRIPTION - ORIENTATION
ORIENTATION: LOWER
ORIENTATION: POSTERIOR

## 2023-08-04 ASSESSMENT — PAIN SCALES - WONG BAKER: WONGBAKER_NUMERICALRESPONSE: 2

## 2023-08-04 ASSESSMENT — PAIN DESCRIPTION - LOCATION
LOCATION: BACK
LOCATION: BACK

## 2023-08-04 ASSESSMENT — PAIN DESCRIPTION - DESCRIPTORS
DESCRIPTORS: ACHING;TIGHTNESS
DESCRIPTORS: ACHING

## 2023-08-04 NOTE — PLAN OF CARE
Problem: Physical Therapy - Adult  Goal: By Discharge: Performs mobility at highest level of function for planned discharge setting. See evaluation for individualized goals. Description: FUNCTIONAL STATUS PRIOR TO ADMISSION: Patient was modified independent using a 4 wheeled walker and quad cane for functional mobility. HOME SUPPORT PRIOR TO ADMISSION: The patient lived alone but did not require assistance. Physical Therapy Goals  Initiated 8/4/2023  Pt stated goal: To go home  Pt will be I with [facility] in 7 days. Pt will perform bed mobility with Modified Lamar in 7 days. Pt will perform transfers with Modified Lamar in 7 days. Pt will amb 250 feet with LRAD safely with Modified Lamar in 7 days. 8/4/2023 1513 by Renetta Moss PT  Outcome: Progressing  PHYSICAL THERAPY EVALUATION  Patient: Martín Mancini (87 y.o. female)  Date: 8/4/2023  Primary Diagnosis: Anemia [D64.9]  Frequent falls [R29.6]  Symptomatic anemia [D64.9]       Precautions: Fall Risk                In place during session: None  ASSESSMENT  Pt is a 65 y.o. female with PMH  of multiple myeloma, HTN, GERD, [facility]clogist d/t low hemoglobin and c/o back pain after a fall 2 weeks ago admitted on 8/2/2023 for anemia and multiple falls. MRI of the thoracic and lumbar spine shows chronic vertebral and sternal fractures. Per radiologist note, Kyphoplasty not recommended  given the chronic nature of the the patient's fractures. Pt received sitting up on the chair upon PT arrival, agreeable to evaluation. Pt A&O x 4      Based on the objective data described below, the patient currently presents with impaired functional mobility, impaired strength, decreased activity tolerance, and impaired balance. (See below for objective details and assist levels).  Pt requires SBA/CGA for STS and SPT with cues for hand placement , is able to ambulate ~100' with quad cane, SBA/CGA,

## 2023-08-04 NOTE — CONSULTS
INTERVENTIONAL RADIOLOGY  Consult Note      Patient:  Zackary Valentine  :  1945  Age:  65 y.o. MRN:  [id number]    Today's Date:  2023  Admission Date:  2023  Hospital Day:  2  Consult requested by: Lafaye Aschoff, MD      CC / HPI   Zackary Valentine is a 65 y.o. female with a history of multiple myeloma, HTN, GERD, VTE, who is admitted with macrocytic anemia. Patient c/o a 2 week history of back pain after falling across her sofa. MRI of the thoracic and lumbar spine shows chronic vertebral and sternal fractures. IR consultation is requested for possible kyphoplasty.     PAST MEDICAL HISTORY  Past Medical History:   Diagnosis Date    Arthritis     GERD (gastroesophageal reflux disease)     Hypertension     Immunosuppression due to drug therapy ([address])     Multiple myeloma ([address])     Thromboembolus ([address])      PAST SURGICAL HISTORY  Past Surgical History:   Procedure Laterality Date    BONE MARROW TRANSPLANT      GYN      vaginal prolaps     SOCIAL HISTORY  Social History     Socioeconomic History    Marital status: Single     Spouse name: Not on file    Number of children: Not on file    Years of education: Not on file    Highest education level: Not on file   Occupational History    Not on file   Tobacco Use    Smoking status: Former    Smokeless tobacco: Never    Tobacco comments:     Quit smoking: quit 50 years ago   Substance and Sexual Activity    Alcohol use: Never    Drug use: Never    Sexual activity: Not on file   Other Topics Concern    Not on file   Social History Narrative    Not on file     Social Determinants of Health     Financial Resource Strain: Not on file   Food Insecurity: Not on file   Transportation Needs: Not on file   Physical Activity: Not on file   Stress: Not on file   Social Connections: Not on file   Intimate Partner Violence: Not on file   Housing Stability: Not on file     FAMILY HISTORY  Family History   Problem Relation Age of Onset    Hypertension
costovertebral angle tenderness. Extremities: No edema, cyanosis or clubbing. Skin: No petechiae; no skin rash. Neurologic: Alert/oriented x 3; no focal neurological deficits.     Recent Results (from the past 24 hour(s))   CBC with Auto Differential    Collection Time: 08/04/23  7:10 AM   Result Value Ref Range    WBC 7.9 3.6 - 11.0 K/uL    RBC 2.36 (L) 3.80 - 5.20 M/uL    Hemoglobin 7.8 (L) 11.5 - 16.0 g/dL    Hematocrit 24.9 (L) 35.0 - 47.0 %    .5 (H) 80.0 - 99.0 FL    MCH 33.1 26.0 - 34.0 PG    MCHC 31.3 30.0 - 36.5 g/dL    RDW 18.6 (H) 11.5 - 14.5 %    Platelets 83 (L) 412 - 400 K/uL    MPV 12.6 8.9 - 12.9 FL    Nucleated RBCs 1.0 (H) 0.0  WBC    nRBC 0.08 (H) 0.00 - 0.01 K/uL    Neutrophils % 32 32 - 75 %    Band Neutrophils 4 0 - 6 %    Lymphocytes % 43 12 - 49 %    Monocytes % 21 (H) 5 - 13 %    Eosinophils % 0 0 - 7 %    Basophils % 0 0 - 1 %    Immature Granulocytes 0 %    Neutrophils Absolute 2.8 1.8 - 8.0 K/UL    Lymphocytes Absolute 3.4 0.8 - 3.5 K/UL    Monocytes Absolute 1.7 (H) 0.0 - 1.0 K/UL    Eosinophils Absolute 0.0 0.0 - 0.4 K/UL    Basophils Absolute 0.0 0.0 - 0.1 K/UL    Absolute Immature Granulocyte 0.0 K/UL    Differential Type Manual      Platelet Comment DECREASED PLATELETS     RBC Comment Anisocytosis  2+        RBC Comment Macrocytosis  2+        RBC Comment Ovalocytes  1+       Renal Function Panel    Collection Time: 08/04/23  7:10 AM   Result Value Ref Range    Sodium 143 136 - 145 mmol/L    Potassium 4.4 3.5 - 5.1 mmol/L    Chloride 112 (H) 97 - 108 mmol/L    CO2 28 21 - 32 mmol/L    Anion Gap 3 (L) 5 - 15 mmol/L    Glucose 87 65 - 100 mg/dL    BUN 30 (H) 6 - 20 mg/dL    Creatinine 0.79 0.55 - 1.02 mg/dL    Bun/Cre Ratio 38 (H) 12 - 20      Est, Glom Filt Rate >60 >60 ml/min/1.73m2    Calcium 9.6 8.5 - 10.1 mg/dL    Phosphorus 3.9 2.6 - 4.7 mg/dL    Albumin 3.1 (L) 3.5 - 5.0 g/dL   Magnesium    Collection Time: 08/04/23  7:10 AM   Result Value Ref Range    Magnesium

## 2023-08-05 LAB
ABO + RH BLD: NORMAL
ANION GAP SERPL CALC-SCNC: 4 MMOL/L (ref 5–15)
BASOPHILS # BLD: 0 K/UL (ref 0–0.1)
BASOPHILS NFR BLD: 0 % (ref 0–1)
BLOOD GROUP ANTIBODIES SERPL: NEGATIVE
BUN SERPL-MCNC: 33 MG/DL (ref 6–20)
BUN/CREAT SERPL: 38 (ref 12–20)
CA-I BLD-MCNC: 9.4 MG/DL (ref 8.5–10.1)
CHLORIDE SERPL-SCNC: 111 MMOL/L (ref 97–108)
CO2 SERPL-SCNC: 28 MMOL/L (ref 21–32)
CREAT SERPL-MCNC: 0.86 MG/DL (ref 0.55–1.02)
DIFFERENTIAL METHOD BLD: ABNORMAL
EOSINOPHIL # BLD: 0 K/UL (ref 0–0.4)
EOSINOPHIL NFR BLD: 0 % (ref 0–7)
ERYTHROCYTE [DISTWIDTH] IN BLOOD BY AUTOMATED COUNT: 18 % (ref 11.5–14.5)
FERRITIN SERPL-MCNC: 1301 NG/ML (ref 8–252)
FOLATE SERPL-MCNC: 28 NG/ML (ref 5–21)
GLUCOSE SERPL-MCNC: 92 MG/DL (ref 65–100)
HCT VFR BLD AUTO: 22.1 % (ref 35–47)
HCT VFR BLD AUTO: 22.3 % (ref 35–47)
HCT VFR BLD AUTO: 30.5 % (ref 35–47)
HGB BLD-MCNC: 6.9 G/DL (ref 11.5–16)
HGB BLD-MCNC: 7 G/DL (ref 11.5–16)
HGB BLD-MCNC: 9.6 G/DL (ref 11.5–16)
IMM GRANULOCYTES # BLD AUTO: 0 K/UL
IMM GRANULOCYTES NFR BLD AUTO: 0 %
IRON SATN MFR SERPL: 62 % (ref 20–50)
IRON SERPL-MCNC: 176 UG/DL (ref 35–150)
LYMPHOCYTES # BLD: 5.1 K/UL (ref 0.8–3.5)
LYMPHOCYTES NFR BLD: 59 % (ref 12–49)
MCH RBC QN AUTO: 33.3 PG (ref 26–34)
MCHC RBC AUTO-ENTMCNC: 31.2 G/DL (ref 30–36.5)
MCV RBC AUTO: 106.8 FL (ref 80–99)
MONOCYTES # BLD: 0.3 K/UL (ref 0–1)
MONOCYTES NFR BLD: 3 % (ref 5–13)
NEUTS SEG # BLD: 3.1 K/UL (ref 1.8–8)
NEUTS SEG NFR BLD: 36 % (ref 32–75)
NRBC # BLD: 0.07 K/UL (ref 0–0.01)
NRBC BLD-RTO: 0.8 PER 100 WBC
OTHER CELLS NFR BLD MANUAL: 2 %
PLATELET # BLD AUTO: 77 K/UL (ref 150–400)
PMV BLD AUTO: 11.6 FL (ref 8.9–12.9)
POTASSIUM SERPL-SCNC: 3.9 MMOL/L (ref 3.5–5.1)
RBC # BLD AUTO: 2.07 M/UL (ref 3.8–5.2)
RBC MORPH BLD: ABNORMAL
SODIUM SERPL-SCNC: 143 MMOL/L (ref 136–145)
SPECIMEN EXP DATE BLD: NORMAL
TIBC SERPL-MCNC: 283 UG/DL (ref 250–450)
VIT B12 SERPL-MCNC: 898 PG/ML (ref 193–986)
WBC # BLD AUTO: 8.6 K/UL (ref 3.6–11)

## 2023-08-05 PROCEDURE — 2580000003 HC RX 258: Performed by: INTERNAL MEDICINE

## 2023-08-05 PROCEDURE — 82746 ASSAY OF FOLIC ACID SERUM: CPT

## 2023-08-05 PROCEDURE — 85018 HEMOGLOBIN: CPT

## 2023-08-05 PROCEDURE — 86850 RBC ANTIBODY SCREEN: CPT

## 2023-08-05 PROCEDURE — 1100000000 HC RM PRIVATE

## 2023-08-05 PROCEDURE — 86901 BLOOD TYPING SEROLOGIC RH(D): CPT

## 2023-08-05 PROCEDURE — 80048 BASIC METABOLIC PNL TOTAL CA: CPT

## 2023-08-05 PROCEDURE — 85025 COMPLETE CBC W/AUTO DIFF WBC: CPT

## 2023-08-05 PROCEDURE — 6370000000 HC RX 637 (ALT 250 FOR IP): Performed by: HOSPITALIST

## 2023-08-05 PROCEDURE — 85014 HEMATOCRIT: CPT

## 2023-08-05 PROCEDURE — P9016 RBC LEUKOCYTES REDUCED: HCPCS

## 2023-08-05 PROCEDURE — 86900 BLOOD TYPING SEROLOGIC ABO: CPT

## 2023-08-05 PROCEDURE — 82607 VITAMIN B-12: CPT

## 2023-08-05 PROCEDURE — 2580000003 HC RX 258: Performed by: HOSPITALIST

## 2023-08-05 PROCEDURE — 36430 TRANSFUSION BLD/BLD COMPNT: CPT

## 2023-08-05 PROCEDURE — 83540 ASSAY OF IRON: CPT

## 2023-08-05 PROCEDURE — 82728 ASSAY OF FERRITIN: CPT

## 2023-08-05 PROCEDURE — 36415 COLL VENOUS BLD VENIPUNCTURE: CPT

## 2023-08-05 RX ORDER — RIVAROXABAN 10 MG/1
10 TABLET, FILM COATED ORAL
Qty: 30 TABLET | Refills: 1 | Status: SHIPPED | OUTPATIENT
Start: 2023-08-05

## 2023-08-05 RX ORDER — SODIUM CHLORIDE 9 MG/ML
INJECTION, SOLUTION INTRAVENOUS PRN
Status: COMPLETED | OUTPATIENT
Start: 2023-08-05 | End: 2023-08-05

## 2023-08-05 RX ADMIN — ALLOPURINOL 300 MG: 300 TABLET ORAL at 09:54

## 2023-08-05 RX ADMIN — OXYCODONE HYDROCHLORIDE 5 MG: 5 TABLET ORAL at 09:56

## 2023-08-05 RX ADMIN — ALPRAZOLAM 0.5 MG: 0.5 TABLET ORAL at 22:08

## 2023-08-05 RX ADMIN — SODIUM CHLORIDE, PRESERVATIVE FREE 10 ML: 5 INJECTION INTRAVENOUS at 09:49

## 2023-08-05 RX ADMIN — SENNOSIDES AND DOCUSATE SODIUM 2 TABLET: 50; 8.6 TABLET ORAL at 22:07

## 2023-08-05 RX ADMIN — SODIUM CHLORIDE: 9 INJECTION, SOLUTION INTRAVENOUS at 17:08

## 2023-08-05 RX ADMIN — SODIUM CHLORIDE, PRESERVATIVE FREE 10 ML: 5 INJECTION INTRAVENOUS at 22:07

## 2023-08-05 RX ADMIN — CARVEDILOL 12.5 MG: 12.5 TABLET, FILM COATED ORAL at 22:05

## 2023-08-05 RX ADMIN — SENNOSIDES AND DOCUSATE SODIUM 2 TABLET: 50; 8.6 TABLET ORAL at 09:48

## 2023-08-05 RX ADMIN — OXYCODONE HYDROCHLORIDE 5 MG: 5 TABLET ORAL at 22:06

## 2023-08-05 ASSESSMENT — PAIN SCALES - GENERAL
PAINLEVEL_OUTOF10: 7
PAINLEVEL_OUTOF10: 5

## 2023-08-05 ASSESSMENT — PAIN DESCRIPTION - LOCATION: LOCATION: BACK

## 2023-08-06 VITALS
SYSTOLIC BLOOD PRESSURE: 123 MMHG | OXYGEN SATURATION: 94 % | DIASTOLIC BLOOD PRESSURE: 60 MMHG | RESPIRATION RATE: 18 BRPM | TEMPERATURE: 98.8 F | BODY MASS INDEX: 20.33 KG/M2 | HEART RATE: 68 BPM | HEIGHT: 65 IN | WEIGHT: 122 LBS

## 2023-08-06 LAB
ABO + RH BLD: NORMAL
ANION GAP SERPL CALC-SCNC: 5 MMOL/L (ref 5–15)
BASOPHILS # BLD: 0 K/UL (ref 0–0.1)
BASOPHILS NFR BLD: 0 % (ref 0–1)
BLD PROD TYP BPU: NORMAL
BLD PROD TYP BPU: NORMAL
BLOOD BANK DISPENSE STATUS: NORMAL
BLOOD BANK DISPENSE STATUS: NORMAL
BLOOD GROUP ANTIBODIES SERPL: NEGATIVE
BPU ID: NORMAL
BPU ID: NORMAL
BUN SERPL-MCNC: 24 MG/DL (ref 6–20)
BUN/CREAT SERPL: 29 (ref 12–20)
CA-I BLD-MCNC: 9.7 MG/DL (ref 8.5–10.1)
CHLORIDE SERPL-SCNC: 110 MMOL/L (ref 97–108)
CO2 SERPL-SCNC: 30 MMOL/L (ref 21–32)
CREAT SERPL-MCNC: 0.83 MG/DL (ref 0.55–1.02)
CROSSMATCH RESULT: NORMAL
CROSSMATCH RESULT: NORMAL
DIFFERENTIAL METHOD BLD: ABNORMAL
EOSINOPHIL # BLD: 0 K/UL (ref 0–0.4)
EOSINOPHIL NFR BLD: 0 % (ref 0–7)
ERYTHROCYTE [DISTWIDTH] IN BLOOD BY AUTOMATED COUNT: 19.3 % (ref 11.5–14.5)
GLUCOSE SERPL-MCNC: 123 MG/DL (ref 65–100)
HCT VFR BLD AUTO: 30 % (ref 35–47)
HGB BLD-MCNC: 9.4 G/DL (ref 11.5–16)
IMM GRANULOCYTES # BLD AUTO: 0 K/UL
IMM GRANULOCYTES NFR BLD AUTO: 0 %
LYMPHOCYTES # BLD: 6.5 K/UL (ref 0.8–3.5)
LYMPHOCYTES NFR BLD: 68 % (ref 12–49)
MCH RBC QN AUTO: 32.2 PG (ref 26–34)
MCHC RBC AUTO-ENTMCNC: 31.3 G/DL (ref 30–36.5)
MCV RBC AUTO: 102.7 FL (ref 80–99)
MONOCYTES # BLD: 0.2 K/UL (ref 0–1)
MONOCYTES NFR BLD: 2 % (ref 5–13)
NEUTS SEG # BLD: 2.8 K/UL (ref 1.8–8)
NEUTS SEG NFR BLD: 29 % (ref 32–75)
NRBC # BLD: 0.07 K/UL (ref 0–0.01)
NRBC # BLD: 0.38 K/UL
NRBC BLD MANUAL-RTO: 4 PER 100 WBC
NRBC BLD-RTO: 0.7 PER 100 WBC
PLATELET # BLD AUTO: 86 K/UL (ref 150–400)
PMV BLD AUTO: 13.1 FL (ref 8.9–12.9)
POTASSIUM SERPL-SCNC: 3.4 MMOL/L (ref 3.5–5.1)
PROMYELOCYTES NFR BLD MANUAL: 1 %
RBC # BLD AUTO: 2.92 M/UL (ref 3.8–5.2)
RBC MORPH BLD: ABNORMAL
RBC MORPH BLD: ABNORMAL
SODIUM SERPL-SCNC: 145 MMOL/L (ref 136–145)
SPECIMEN EXP DATE BLD: NORMAL
TRANSFUSION STATUS PATIENT QL: NORMAL
TRANSFUSION STATUS PATIENT QL: NORMAL
UNIT DIVISION: 0
UNIT DIVISION: 0
WBC # BLD AUTO: 9.5 K/UL (ref 3.6–11)

## 2023-08-06 PROCEDURE — 6370000000 HC RX 637 (ALT 250 FOR IP): Performed by: HOSPITALIST

## 2023-08-06 PROCEDURE — 85025 COMPLETE CBC W/AUTO DIFF WBC: CPT

## 2023-08-06 PROCEDURE — 80048 BASIC METABOLIC PNL TOTAL CA: CPT

## 2023-08-06 PROCEDURE — 2580000003 HC RX 258: Performed by: HOSPITALIST

## 2023-08-06 PROCEDURE — 36415 COLL VENOUS BLD VENIPUNCTURE: CPT

## 2023-08-06 RX ORDER — POTASSIUM CHLORIDE 1.5 G/1.58G
40 POWDER, FOR SOLUTION ORAL ONCE
Status: DISCONTINUED | OUTPATIENT
Start: 2023-08-06 | End: 2023-08-06 | Stop reason: HOSPADM

## 2023-08-06 RX ADMIN — SODIUM CHLORIDE, PRESERVATIVE FREE 10 ML: 5 INJECTION INTRAVENOUS at 09:13

## 2023-08-06 RX ADMIN — SENNOSIDES AND DOCUSATE SODIUM 2 TABLET: 50; 8.6 TABLET ORAL at 09:12

## 2023-08-06 RX ADMIN — ALLOPURINOL 300 MG: 300 TABLET ORAL at 09:12

## 2023-08-06 RX ADMIN — CARVEDILOL 12.5 MG: 12.5 TABLET, FILM COATED ORAL at 09:12

## 2023-08-06 NOTE — DISCHARGE SUMMARY
Hospitalist Discharge Summary     Patient ID:  Vijaya Castillo  [id number]  84 y.o.  1945  8/2/2023    PCP on record: Charisse Jim MD    Admit date: 8/2/2023  Discharge date and time: 8/6/2023    DISCHARGE DIAGNOSIS:    Anemia/thrombocytopenia/chronic lumbar compression fracture/history of DVT/history of physical deconditioning/recurrent falls/hypertension/chronic gout    CONSULTATIONS:  IP CONSULT TO ONCOLOGY  IP CONSULT TO HEMATOLOGY  IP CONSULT TO INTERVENTIONAL RADIOLOGY    Excerpted HPI from H&P of Levi Alcala MD:  Omer Salazar is a 65 y.o.  female with PMHx as listed below, poor historian who reports that she is in remission from multiple myeloma for the past 11 years, who is referred to the ER from her oncologist office for anemia with a hemoglobin of 6.8. She reports having a bone marrow biopsy done 2 weeks ago at [facility] and went to see her oncologist today to discuss the result. She has been having pain in her mid back for the past 2 weeks following a fall where she fell across her sofa. She went to see her PCP yesterday and blood pressure was reportedly low 90/50 so her amlodipine was discontinued and she was also told that her oxygen level was low. She has been feeling weak. Denies headache or lightheadedness. No fevers or chills. She denies any signs of bleeding. In the ER she had CT head, CTA of the chest and CT of the lumbar spine for trauma evaluation. We were asked to admit for work up and evaluation of the above problems. ______________________________________________________________________  DISCHARGE SUMMARY/HOSPITAL COURSE:  for full details see H&P, daily progress notes, labs, consult notes.      Patient was subsequently admitted to [facility] further evaluation as well as management, patient was seen MRI spine for further evaluation of compression fractures, found to be chronic, interventional radiology was
As per above physicians  Follow-up Information       Follow up With Specialties Details Why Contact Info    Cely Alford MD Internal Medicine Follow up in 2 week(s)  [address] [facility] [address] Radha Bergeron MD Internal Medicine Follow up in 2 week(s)  [address]  [phone]            ________________________________________________________________    Risk of deterioration: Low    Condition at Discharge:  Stable  __________________________________________________________________    Disposition  Home with family and home health services    ____________________________________________________________________    Code Status: Full Code  ___________________________________________________________________      Total time in minutes spent coordinating this discharge (includes going over instructions, follow-up, prescriptions, and preparing report for sign off to her PCP) :  45 minutes    Signed:   Stephanie Trevino MD

## 2023-08-06 NOTE — CARE COORDINATION
DC Plan: Home    Awaiting PT/OT eval.
DC Plan: Home with Jacob  (formerly known as Shaila) Yesenia Raiv - 8/7/23       Transportation: daughter 3pm    Nurse informed writer daughter wants PCA and [facility] set up prior to dc. Pt only has [employer] Pt does not have a payor source for a PCA. Cm met with pt at the bedside to f/up on her dc plan. CM discussed home health. Pt is agreeable with home health services. Pt reports using [facility] in the past and would like to use them again. [place] is now known as [facility]. Choice letter signed. Personal care Medicaid was discussed. Cm explained to pt that she would need to apply online or f/up with her local dept of  for personal are medicaid screen. Cm explained to pt if she wants personal care right away, she will have to privately pay. Cm attempted to give pt a list of personal care agencies, however she declined it. Pt reports she is unable to privately pay for an aid. Pt is agreeable with Cm giving her contact information for [facility]. Pt is aware of dc home today. Pt is okay with Cm contacting her daughter. Cm called pt's daughter Mally Room [phone]. Pt's dc plan was discussed. Daughter would like home health arranged and accepted prior to her picking up her mother. Medicaid for personal care was discussed. Daughter did a three way call and included her cousin - Daniel Zendejas in the conversation. Daughter asked for web site to apply for Medicaid. CM provided daughter web site information. Cm will call daughter back once pt is approved for home health. [facility]  accepted. Cm received a SOC date for 8/7 (Monday)    Cm called daughter back. Cm spoke with daughter and Daniel Zendejas. Cm updated them. Daughter indicated she will be at the hospital around 3pm to  her mother. Cm updated pt's nurse. Cm met with pt at the bedside and updated her. Cm provide pt with [facility] phone#, [facility] phone# and web site information for [employer] application.
Consult (CM Consult) Discharge [facility] Discharge None   [employer] Information Provided? No   Mode of Transport at Discharge Other (see comment)  (Family to transport home.)   Confirm Follow Up Transport Family     CM met with pt & xochitl August Left) & D/C Plan is home alone & family to transport. Send Rxs to Jo Blancas upon discharge. Uses cane/walker.

## 2023-08-06 NOTE — DISCHARGE INSTR - COC
information and transfer of Balwinder Contreras  is necessary for the continuing treatment of the diagnosis listed and that she requires {Admit to Appropriate Level of Care:34900} for {GREATER/LESS:039237743} 30 days.      Update Admission H&P: {CHP DME Changes in PVYII:171098739}    PHYSICIAN SIGNATURE:  {Esignature:[id number]}

## 2023-08-07 LAB
KAPPA LC FREE SER-MCNC: 6157.6 MG/L (ref 3.3–19.4)
KAPPA LC FREE/LAMBDA FREE SER: 1986.32 (ref 0.26–1.65)
LAMBDA LC FREE SERPL-MCNC: 3.1 MG/L (ref 5.7–26.3)

## 2023-08-31 ENCOUNTER — APPOINTMENT (OUTPATIENT)
Facility: HOSPITAL | Age: 78
End: 2023-08-31
Payer: MEDICARE

## 2023-08-31 ENCOUNTER — HOSPITAL ENCOUNTER (OUTPATIENT)
Facility: HOSPITAL | Age: 78
Discharge: HOME | End: 2023-09-01
Attending: EMERGENCY MEDICINE | Admitting: INTERNAL MEDICINE
Payer: MEDICARE

## 2023-08-31 DIAGNOSIS — D64.9 ANEMIA, UNSPECIFIED TYPE: Primary | ICD-10-CM

## 2023-08-31 LAB
ALBUMIN SERPL-MCNC: 3.4 G/DL (ref 3.5–5)
ALBUMIN/GLOB SERPL: 1.3 (ref 1.1–2.2)
ALP SERPL-CCNC: 119 U/L (ref 45–117)
ALT SERPL-CCNC: 25 U/L (ref 12–78)
ANION GAP SERPL CALC-SCNC: 6 MMOL/L (ref 5–15)
AST SERPL W P-5'-P-CCNC: 27 U/L (ref 15–37)
BASOPHILS # BLD: 0 K/UL (ref 0–0.1)
BASOPHILS NFR BLD: 0 % (ref 0–1)
BILIRUB SERPL-MCNC: 0.5 MG/DL (ref 0.2–1)
BUN SERPL-MCNC: 31 MG/DL (ref 6–20)
BUN/CREAT SERPL: 28 (ref 12–20)
CA-I BLD-MCNC: 9.9 MG/DL (ref 8.5–10.1)
CHLORIDE SERPL-SCNC: 111 MMOL/L (ref 97–108)
CO2 SERPL-SCNC: 30 MMOL/L (ref 21–32)
CREAT SERPL-MCNC: 1.09 MG/DL (ref 0.55–1.02)
DIFFERENTIAL METHOD BLD: ABNORMAL
EOSINOPHIL # BLD: 0 K/UL (ref 0–0.4)
EOSINOPHIL NFR BLD: 0 % (ref 0–7)
ERYTHROCYTE [DISTWIDTH] IN BLOOD BY AUTOMATED COUNT: 20.5 % (ref 11.5–14.5)
GLOBULIN SER CALC-MCNC: 2.7 G/DL (ref 2–4)
GLUCOSE SERPL-MCNC: 121 MG/DL (ref 65–100)
HCT VFR BLD AUTO: 16.3 % (ref 35–47)
HGB BLD-MCNC: 4.9 G/DL (ref 11.5–16)
IMM GRANULOCYTES # BLD AUTO: 0 K/UL
IMM GRANULOCYTES NFR BLD AUTO: 0 %
LYMPHOCYTES # BLD: 3.1 K/UL (ref 0.8–3.5)
LYMPHOCYTES NFR BLD: 37 % (ref 12–49)
MCH RBC QN AUTO: 32.2 PG (ref 26–34)
MCHC RBC AUTO-ENTMCNC: 30.1 G/DL (ref 30–36.5)
MCV RBC AUTO: 107.2 FL (ref 80–99)
MONOCYTES # BLD: 2.3 K/UL (ref 0–1)
MONOCYTES NFR BLD: 27 % (ref 5–13)
MYELOCYTES NFR BLD MANUAL: 1 %
NEUTS BAND NFR BLD MANUAL: 2 % (ref 0–6)
NEUTS SEG # BLD: 3 K/UL (ref 1.8–8)
NEUTS SEG NFR BLD: 33 % (ref 32–75)
NRBC # BLD: 0.06 K/UL (ref 0–0.01)
NRBC BLD-RTO: 0.7 PER 100 WBC
PLATELET # BLD AUTO: 78 K/UL (ref 150–400)
PLATELET COMMENT: ABNORMAL
PMV BLD AUTO: 11.4 FL (ref 8.9–12.9)
POTASSIUM SERPL-SCNC: 3.5 MMOL/L (ref 3.5–5.1)
PROT SERPL-MCNC: 6.1 G/DL (ref 6.4–8.2)
RBC # BLD AUTO: 1.52 M/UL (ref 3.8–5.2)
RBC MORPH BLD: ABNORMAL
SODIUM SERPL-SCNC: 147 MMOL/L (ref 136–145)
TROPONIN I SERPL HS-MCNC: 39 NG/L (ref 0–51)
WBC # BLD AUTO: 8.5 K/UL (ref 3.6–11)

## 2023-08-31 PROCEDURE — 86870 RBC ANTIBODY IDENTIFICATION: CPT

## 2023-08-31 PROCEDURE — 86901 BLOOD TYPING SEROLOGIC RH(D): CPT

## 2023-08-31 PROCEDURE — P9016 RBC LEUKOCYTES REDUCED: HCPCS

## 2023-08-31 PROCEDURE — 84484 ASSAY OF TROPONIN QUANT: CPT

## 2023-08-31 PROCEDURE — 86922 COMPATIBILITY TEST ANTIGLOB: CPT

## 2023-08-31 PROCEDURE — 86850 RBC ANTIBODY SCREEN: CPT

## 2023-08-31 PROCEDURE — 86920 COMPATIBILITY TEST SPIN: CPT

## 2023-08-31 PROCEDURE — 85025 COMPLETE CBC W/AUTO DIFF WBC: CPT

## 2023-08-31 PROCEDURE — 86900 BLOOD TYPING SEROLOGIC ABO: CPT

## 2023-08-31 PROCEDURE — 36415 COLL VENOUS BLD VENIPUNCTURE: CPT

## 2023-08-31 PROCEDURE — 86921 COMPATIBILITY TEST INCUBATE: CPT

## 2023-08-31 PROCEDURE — 94761 N-INVAS EAR/PLS OXIMETRY MLT: CPT

## 2023-08-31 PROCEDURE — 36430 TRANSFUSION BLD/BLD COMPNT: CPT

## 2023-08-31 PROCEDURE — 71045 X-RAY EXAM CHEST 1 VIEW: CPT

## 2023-08-31 PROCEDURE — 6370000000 HC RX 637 (ALT 250 FOR IP): Performed by: HOSPITALIST

## 2023-08-31 PROCEDURE — 6370000000 HC RX 637 (ALT 250 FOR IP): Performed by: EMERGENCY MEDICINE

## 2023-08-31 PROCEDURE — 86880 COOMBS TEST DIRECT: CPT

## 2023-08-31 PROCEDURE — 99285 EMERGENCY DEPT VISIT HI MDM: CPT

## 2023-08-31 PROCEDURE — 80053 COMPREHEN METABOLIC PANEL: CPT

## 2023-08-31 RX ORDER — SODIUM CHLORIDE 9 MG/ML
INJECTION, SOLUTION INTRAVENOUS PRN
Status: DISCONTINUED | OUTPATIENT
Start: 2023-08-31 | End: 2023-09-01 | Stop reason: HOSPADM

## 2023-08-31 RX ORDER — ACETAMINOPHEN 500 MG
1000 TABLET ORAL
Status: COMPLETED | OUTPATIENT
Start: 2023-08-31 | End: 2023-08-31

## 2023-08-31 RX ORDER — OXYCODONE HYDROCHLORIDE AND ACETAMINOPHEN 5; 325 MG/1; MG/1
1 TABLET ORAL ONCE
Status: COMPLETED | OUTPATIENT
Start: 2023-08-31 | End: 2023-08-31

## 2023-08-31 RX ADMIN — OXYCODONE AND ACETAMINOPHEN 1 TABLET: 5; 325 TABLET ORAL at 23:09

## 2023-08-31 RX ADMIN — ACETAMINOPHEN 1000 MG: 500 TABLET ORAL at 13:14

## 2023-08-31 ASSESSMENT — PAIN SCALES - GENERAL
PAINLEVEL_OUTOF10: 3
PAINLEVEL_OUTOF10: 4
PAINLEVEL_OUTOF10: 5

## 2023-08-31 NOTE — ED PROVIDER NOTES
-Stable  PERFORMED BY - Self    NOTES   :  I have spent 32 minutes of critical care time involved in lab review, consultations with specialist, family decision- making, bedside attention and documentation. This time excludes time spent in any separate billed procedures. During this entire length of time I was immediately available to the patient . Anton Araiza MD    ED FINAL IMPRESSION     1. Anemia, unspecified type          DISPOSITION/PLAN   DISPOSITION Admitted 08/31/2023 02:18:16 PM     Transfuse blood and same-day surgery and discharge if no reaction. Hospitalist dr Jacqui Schmitz available if needed. PATIENT REFERRED TO:  Pepe Leon MD  [address] [address]  [phone]              DISCHARGE MEDICATIONS:     Medication List        ASK your doctor about these medications      allopurinol 300 MG tablet  Commonly known as: ZYLOPRIM     ALPRAZolam 0.5 MG tablet  Commonly known as: XANAX     calcium carbonate 1250 (500 Ca) MG tablet  Commonly known as: OYSTER SHELL CALCIUM 500 mg     carvedilol 12.5 MG tablet  Commonly known as: COREG     dexamethasone 4 MG tablet  Commonly known as: DECADRON     Xarelto 10 MG Tabs tablet  Generic drug: rivaroxaban  Take 1 tablet by mouth daily (with breakfast)                DISCONTINUED MEDICATIONS:  Current Discharge Medication List          I am the Primary Clinician of Record. Anton Araiza MD (electronically signed)    (Please note that parts of this dictation were completed with voice recognition software. Quite often unanticipated grammatical, syntax, homophones, and other interpretive errors are inadvertently transcribed by the computer software. Please disregards these errors.  Please excuse any errors that have escaped final proofreading.)     Anton Araiza MD  08/31/23 6426

## 2023-08-31 NOTE — ED TRIAGE NOTES
Patient went to Dr Izzy Ledezma today and was told to come over for a blood transfusion due to a HMG of 5.3, hct 16.5

## 2023-08-31 NOTE — DISCHARGE INSTRUCTIONS
Thank you! Thank you for allowing me to care for you in the emergency department. It is my goal to provide you with excellent care. If you have not received excellent quality care, please ask to speak to the nurse manager. Please fill out the survey that will come to you by mail or email since we listen to your feedback! Below you will find a list of your tests from today's visit. Should you have any questions, please do not hesitate to call the emergency department.     Labs  Recent Results (from the past 12 hour(s))   CBC with Auto Differential    Collection Time: 08/31/23 12:31 PM   Result Value Ref Range    WBC 8.5 3.6 - 11.0 K/uL    RBC 1.52 (L) 3.80 - 5.20 M/uL    Hemoglobin 4.9 (LL) 11.5 - 16.0 g/dL    Hematocrit 16.3 (LL) 35.0 - 47.0 %    .2 (H) 80.0 - 99.0 FL    MCH 32.2 26.0 - 34.0 PG    MCHC 30.1 30.0 - 36.5 g/dL    RDW 20.5 (H) 11.5 - 14.5 %    Platelets 78 (L) 541 - 400 K/uL    MPV 11.4 8.9 - 12.9 FL    Nucleated RBCs 0.7 (H) 0.0  WBC    nRBC 0.06 (H) 0.00 - 0.01 K/uL    Neutrophils % 33 32 - 75 %    Band Neutrophils 2 0 - 6 %    Lymphocytes % 37 12 - 49 %    Monocytes % 27 (H) 5 - 13 %    Eosinophils % 0 0 - 7 %    Basophils % 0 0 - 1 %    Myelocytes 1 (H) 0 %    Immature Granulocytes 0 %    Neutrophils Absolute 3.0 1.8 - 8.0 K/UL    Lymphocytes Absolute 3.1 0.8 - 3.5 K/UL    Monocytes Absolute 2.3 (H) 0.0 - 1.0 K/UL    Eosinophils Absolute 0.0 0.0 - 0.4 K/UL    Basophils Absolute 0.0 0.0 - 0.1 K/UL    Absolute Immature Granulocyte 0.0 K/UL    Differential Type Manual      Platelet Comment DECREASED PLATELETS     RBC Comment Anisocytosis  1+        RBC Comment Macrocytosis  1+        RBC Comment Hypochromia  1+       Comprehensive Metabolic Panel    Collection Time: 08/31/23 12:31 PM   Result Value Ref Range    Sodium 147 (H) 136 - 145 mmol/L    Potassium 3.5 3.5 - 5.1 mmol/L    Chloride 111 (H) 97 - 108 mmol/L    CO2 30 21 - 32 mmol/L    Anion Gap 6 5 - 15 mmol/L    Glucose 121

## 2023-08-31 NOTE — CARE COORDINATION
08/31/23 1500   Service Assessment   Patient Orientation Alert and Oriented   Cognition Alert   History Provided By Patient   Primary Caregiver Self   Accompanied By/Relationship Pt alone during interview. Support Systems Children;Family Members   Patient's Healthcare Decision Maker is: Legal Next of Kin   PCP Verified by CM Yes  Tammy Alfred - seen 2 weeks ago.)   Last Visit to PCP Within last 3 months   Prior Functional Level Independent in ADLs/IADLs;Assistance with the following:;Mobility  (Walker/w/c.)   Current Functional Level Independent in ADLs/IADLs;Assistance with the following:;Mobility  (Walker/w/c)   Can patient return to prior living arrangement Yes   Ability to make needs known: Good   Family able to assist with home care needs: Yes   Would you like for me to discuss the discharge plan with any other family members/significant others, and if so, who? Yes  (Daughter Dolly Saini.)   Financial Resources Medicare   Community Resources None   Social/Functional History   Lives With Alone   Type of [address] [place] [address] One level   [address] to enter without rails   Entrance Stairs - Number of Steps 4 outside steps. Bathroom Shower/Tub Tub/Shower unit   Bathroom Toilet Standard   Bathroom Accessibility Accessible   Home Equipment Walker, standard; [address] Responsibilities Yes   Ambulation Assistance Needs assistance  (Walker/w/c.)   Transfer Assistance Independent   Active  Yes   Occupation Retired   Discharge Planning   Type of [address] [facility] Prior To Admission None   Potential Assistance Needed N/A   DME Ordered?  No   Potential Assistance Purchasing Medications No   Type of Home Care Services Nursing Services;PT   Patient expects to be discharged to: House   One/Two Story Residence One story   History of falls? 0

## 2023-08-31 NOTE — CARE COORDINATION
8/31/23 Per Emani galdamez accepted to [facility] @ [phone] upon discharge. Est SOC: 9/1/23.

## 2023-09-01 VITALS
SYSTOLIC BLOOD PRESSURE: 128 MMHG | RESPIRATION RATE: 20 BRPM | DIASTOLIC BLOOD PRESSURE: 63 MMHG | HEIGHT: 65 IN | TEMPERATURE: 98.6 F | BODY MASS INDEX: 20.33 KG/M2 | OXYGEN SATURATION: 98 % | HEART RATE: 73 BPM | WEIGHT: 122 LBS

## 2023-09-01 LAB
ABO + RH BLD: NORMAL
ANION GAP SERPL CALC-SCNC: 5 MMOL/L (ref 5–15)
ANTIGENS PRESENT RBC DONR: NORMAL
ANTIGENS PRESENT RBC DONR: NORMAL
BASOPHILS # BLD: 0 K/UL (ref 0–0.1)
BASOPHILS NFR BLD: 0 % (ref 0–1)
BLD PROD TYP BPU: NORMAL
BLD PROD TYP BPU: NORMAL
BLOOD BANK DISPENSE STATUS: NORMAL
BLOOD BANK DISPENSE STATUS: NORMAL
BLOOD GROUP ANTIBODIES SERPL: NORMAL
BLOOD GROUP ANTIBODIES SERPL: POSITIVE
BPU ID: NORMAL
BPU ID: NORMAL
BUN SERPL-MCNC: 22 MG/DL (ref 6–20)
BUN/CREAT SERPL: 29 (ref 12–20)
CA-I BLD-MCNC: 9.5 MG/DL (ref 8.5–10.1)
CHLORIDE SERPL-SCNC: 113 MMOL/L (ref 97–108)
CO2 SERPL-SCNC: 28 MMOL/L (ref 21–32)
CREAT SERPL-MCNC: 0.77 MG/DL (ref 0.55–1.02)
CROSSMATCH RESULT: NORMAL
CROSSMATCH RESULT: NORMAL
DAT POLY-SP REAG RBC QL: NEGATIVE
DIFFERENTIAL METHOD BLD: ABNORMAL
EOSINOPHIL # BLD: 0.1 K/UL (ref 0–0.4)
EOSINOPHIL NFR BLD: 1 % (ref 0–7)
ERYTHROCYTE [DISTWIDTH] IN BLOOD BY AUTOMATED COUNT: 20 % (ref 11.5–14.5)
GLUCOSE SERPL-MCNC: 93 MG/DL (ref 65–100)
HCT VFR BLD AUTO: 24.4 % (ref 35–47)
HGB BLD-MCNC: 7.9 G/DL (ref 11.5–16)
IMM GRANULOCYTES # BLD AUTO: 0.1 K/UL (ref 0–0.04)
IMM GRANULOCYTES NFR BLD AUTO: 1 % (ref 0–0.5)
LYMPHOCYTES # BLD: 2 K/UL (ref 0.8–3.5)
LYMPHOCYTES NFR BLD: 33 % (ref 12–49)
MCH RBC QN AUTO: 31.2 PG (ref 26–34)
MCHC RBC AUTO-ENTMCNC: 32.4 G/DL (ref 30–36.5)
MCV RBC AUTO: 96.4 FL (ref 80–99)
MONOCYTES # BLD: 2.1 K/UL (ref 0–1)
MONOCYTES NFR BLD: 36 % (ref 5–13)
NEUTS SEG # BLD: 1.8 K/UL (ref 1.8–8)
NEUTS SEG NFR BLD: 29 % (ref 32–75)
NRBC # BLD: 0.04 K/UL (ref 0–0.01)
NRBC BLD-RTO: 0.7 PER 100 WBC
PLATELET # BLD AUTO: 66 K/UL (ref 150–400)
PMV BLD AUTO: 11.1 FL (ref 8.9–12.9)
POTASSIUM SERPL-SCNC: 3.4 MMOL/L (ref 3.5–5.1)
RBC # BLD AUTO: 2.53 M/UL (ref 3.8–5.2)
RBC MORPH BLD: ABNORMAL
RBC MORPH BLD: ABNORMAL
SODIUM SERPL-SCNC: 146 MMOL/L (ref 136–145)
SPECIMEN EXP DATE BLD: NORMAL
TRANSFUSION STATUS PATIENT QL: NORMAL
TRANSFUSION STATUS PATIENT QL: NORMAL
UNIT DIVISION: 0
UNIT DIVISION: 0
WBC # BLD AUTO: 6.1 K/UL (ref 3.6–11)

## 2023-09-01 PROCEDURE — 80048 BASIC METABOLIC PNL TOTAL CA: CPT

## 2023-09-01 PROCEDURE — 85025 COMPLETE CBC W/AUTO DIFF WBC: CPT

## 2023-09-01 PROCEDURE — 36415 COLL VENOUS BLD VENIPUNCTURE: CPT

## 2023-09-01 NOTE — PROGRESS NOTES
Patient PIV removed by Tech. Patient questions about her hgb levels. RN explained lab values. Patient verbalized an understanding at this time.

## 2023-09-22 ENCOUNTER — HOSPITAL ENCOUNTER (OUTPATIENT)
Facility: HOSPITAL | Age: 78
Setting detail: INFUSION SERIES
End: 2023-09-22
Payer: MEDICARE

## 2023-09-22 ENCOUNTER — HOSPITAL ENCOUNTER (INPATIENT)
Facility: HOSPITAL | Age: 78
LOS: 1 days | Discharge: HOME HEALTH | DRG: 812 | End: 2023-09-23
Attending: INTERNAL MEDICINE | Admitting: INTERNAL MEDICINE
Payer: MEDICARE

## 2023-09-22 VITALS
HEART RATE: 83 BPM | BODY MASS INDEX: 19.97 KG/M2 | SYSTOLIC BLOOD PRESSURE: 116 MMHG | RESPIRATION RATE: 20 BRPM | OXYGEN SATURATION: 99 % | TEMPERATURE: 97.3 F | DIASTOLIC BLOOD PRESSURE: 55 MMHG | WEIGHT: 120 LBS

## 2023-09-22 DIAGNOSIS — Z51.89 ENCOUNTER FOR BLOOD TRANSFUSION: ICD-10-CM

## 2023-09-22 DIAGNOSIS — D64.9 ACUTE ON CHRONIC ANEMIA: Primary | ICD-10-CM

## 2023-09-22 LAB
ALBUMIN SERPL-MCNC: 3.1 G/DL (ref 3.5–5)
ALBUMIN/GLOB SERPL: 1 (ref 1.1–2.2)
ALP SERPL-CCNC: 121 U/L (ref 45–117)
ALT SERPL-CCNC: 23 U/L (ref 12–78)
ANION GAP SERPL CALC-SCNC: 5 MMOL/L (ref 5–15)
AST SERPL W P-5'-P-CCNC: 24 U/L (ref 15–37)
BASOPHILS # BLD: 0 K/UL (ref 0–0.1)
BASOPHILS NFR BLD: 0 % (ref 0–1)
BILIRUB SERPL-MCNC: 0.4 MG/DL (ref 0.2–1)
BUN SERPL-MCNC: 20 MG/DL (ref 6–20)
BUN/CREAT SERPL: 22 (ref 12–20)
CA-I BLD-MCNC: 9.8 MG/DL (ref 8.5–10.1)
CHLORIDE SERPL-SCNC: 107 MMOL/L (ref 97–108)
CO2 SERPL-SCNC: 30 MMOL/L (ref 21–32)
CREAT SERPL-MCNC: 0.9 MG/DL (ref 0.55–1.02)
DIFFERENTIAL METHOD BLD: ABNORMAL
EOSINOPHIL # BLD: 0 K/UL (ref 0–0.4)
EOSINOPHIL NFR BLD: 0 % (ref 0–7)
ERYTHROCYTE [DISTWIDTH] IN BLOOD BY AUTOMATED COUNT: 20.4 % (ref 11.5–14.5)
GLOBULIN SER CALC-MCNC: 3.1 G/DL (ref 2–4)
GLUCOSE SERPL-MCNC: 124 MG/DL (ref 65–100)
HCT VFR BLD AUTO: 19 % (ref 35–47)
HGB BLD-MCNC: 6.1 G/DL (ref 11.5–16)
IMM GRANULOCYTES # BLD AUTO: 0 K/UL
IMM GRANULOCYTES NFR BLD AUTO: 0 %
LYMPHOCYTES # BLD: 4.6 K/UL (ref 0.8–3.5)
LYMPHOCYTES NFR BLD: 50 % (ref 12–49)
MCH RBC QN AUTO: 31.3 PG (ref 26–34)
MCHC RBC AUTO-ENTMCNC: 32.1 G/DL (ref 30–36.5)
MCV RBC AUTO: 97.4 FL (ref 80–99)
MONOCYTES # BLD: 0.7 K/UL (ref 0–1)
MONOCYTES NFR BLD: 8 % (ref 5–13)
NEUTS SEG # BLD: 3.9 K/UL (ref 1.8–8)
NEUTS SEG NFR BLD: 42 % (ref 32–75)
NRBC # BLD: 0.05 K/UL (ref 0–0.01)
NRBC BLD-RTO: 0.5 PER 100 WBC
PLATELET # BLD AUTO: 57 K/UL (ref 150–400)
PLATELET COMMENT: ABNORMAL
PMV BLD AUTO: 11.3 FL (ref 8.9–12.9)
POTASSIUM SERPL-SCNC: 3.5 MMOL/L (ref 3.5–5.1)
PROT SERPL-MCNC: 6.2 G/DL (ref 6.4–8.2)
RBC # BLD AUTO: 1.95 M/UL (ref 3.8–5.2)
RBC MORPH BLD: ABNORMAL
SODIUM SERPL-SCNC: 142 MMOL/L (ref 136–145)
WBC # BLD AUTO: 9.2 K/UL (ref 3.6–11)

## 2023-09-22 PROCEDURE — 99285 EMERGENCY DEPT VISIT HI MDM: CPT

## 2023-09-22 PROCEDURE — 36415 COLL VENOUS BLD VENIPUNCTURE: CPT

## 2023-09-22 PROCEDURE — 86870 RBC ANTIBODY IDENTIFICATION: CPT

## 2023-09-22 PROCEDURE — 86922 COMPATIBILITY TEST ANTIGLOB: CPT

## 2023-09-22 PROCEDURE — P9016 RBC LEUKOCYTES REDUCED: HCPCS

## 2023-09-22 PROCEDURE — 6370000000 HC RX 637 (ALT 250 FOR IP): Performed by: INTERNAL MEDICINE

## 2023-09-22 PROCEDURE — 85025 COMPLETE CBC W/AUTO DIFF WBC: CPT

## 2023-09-22 PROCEDURE — 36430 TRANSFUSION BLD/BLD COMPNT: CPT

## 2023-09-22 PROCEDURE — 1100000000 HC RM PRIVATE

## 2023-09-22 PROCEDURE — 86900 BLOOD TYPING SEROLOGIC ABO: CPT

## 2023-09-22 PROCEDURE — 80053 COMPREHEN METABOLIC PANEL: CPT

## 2023-09-22 PROCEDURE — 86850 RBC ANTIBODY SCREEN: CPT

## 2023-09-22 PROCEDURE — 86920 COMPATIBILITY TEST SPIN: CPT

## 2023-09-22 PROCEDURE — 86901 BLOOD TYPING SEROLOGIC RH(D): CPT

## 2023-09-22 PROCEDURE — 86902 BLOOD TYPE ANTIGEN DONOR EA: CPT

## 2023-09-22 PROCEDURE — 86921 COMPATIBILITY TEST INCUBATE: CPT

## 2023-09-22 RX ORDER — ALPRAZOLAM 0.5 MG/1
1 TABLET ORAL 3 TIMES DAILY PRN
Status: DISCONTINUED | OUTPATIENT
Start: 2023-09-22 | End: 2023-09-23 | Stop reason: HOSPADM

## 2023-09-22 RX ORDER — SODIUM CHLORIDE 9 MG/ML
INJECTION, SOLUTION INTRAVENOUS PRN
Status: DISCONTINUED | OUTPATIENT
Start: 2023-09-22 | End: 2023-09-22

## 2023-09-22 RX ORDER — ACETAMINOPHEN 325 MG/1
650 TABLET ORAL SEE ADMIN INSTRUCTIONS
Status: DISCONTINUED | OUTPATIENT
Start: 2023-09-22 | End: 2023-10-29 | Stop reason: HOSPADM

## 2023-09-22 RX ORDER — SODIUM CHLORIDE 9 MG/ML
INJECTION, SOLUTION INTRAVENOUS PRN
Status: DISCONTINUED | OUTPATIENT
Start: 2023-09-22 | End: 2023-10-29 | Stop reason: HOSPADM

## 2023-09-22 RX ORDER — SODIUM CHLORIDE 9 MG/ML
INJECTION, SOLUTION INTRAVENOUS PRN
Status: DISCONTINUED | OUTPATIENT
Start: 2023-09-22 | End: 2023-09-23 | Stop reason: HOSPADM

## 2023-09-22 RX ORDER — ALLOPURINOL 100 MG/1
300 TABLET ORAL DAILY
Status: DISCONTINUED | OUTPATIENT
Start: 2023-09-22 | End: 2023-09-23 | Stop reason: HOSPADM

## 2023-09-22 RX ORDER — FUROSEMIDE 10 MG/ML
20 INJECTION INTRAMUSCULAR; INTRAVENOUS PRN
Status: DISCONTINUED | OUTPATIENT
Start: 2023-09-22 | End: 2023-10-29 | Stop reason: HOSPADM

## 2023-09-22 RX ORDER — CARVEDILOL 3.12 MG/1
3.12 TABLET ORAL 2 TIMES DAILY
Status: DISCONTINUED | OUTPATIENT
Start: 2023-09-22 | End: 2023-09-23 | Stop reason: HOSPADM

## 2023-09-22 RX ORDER — ACETAMINOPHEN 325 MG/1
650 TABLET ORAL
Status: COMPLETED | OUTPATIENT
Start: 2023-09-22 | End: 2023-09-22

## 2023-09-22 RX ORDER — ACETAMINOPHEN 325 MG/1
650 TABLET ORAL EVERY 4 HOURS PRN
Status: COMPLETED | OUTPATIENT
Start: 2023-09-22 | End: 2023-09-22

## 2023-09-22 RX ORDER — DIPHENHYDRAMINE HYDROCHLORIDE 50 MG/ML
25 INJECTION INTRAMUSCULAR; INTRAVENOUS ONCE
Status: COMPLETED | OUTPATIENT
Start: 2023-09-22 | End: 2023-09-23

## 2023-09-22 RX ORDER — ONDANSETRON 2 MG/ML
4 INJECTION INTRAMUSCULAR; INTRAVENOUS EVERY 8 HOURS PRN
Status: DISCONTINUED | OUTPATIENT
Start: 2023-09-22 | End: 2023-09-23 | Stop reason: HOSPADM

## 2023-09-22 RX ADMIN — ACETAMINOPHEN 650 MG: 325 TABLET, FILM COATED ORAL at 18:08

## 2023-09-22 RX ADMIN — ACETAMINOPHEN 650 MG: 325 TABLET ORAL at 22:25

## 2023-09-22 ASSESSMENT — PAIN SCALES - GENERAL
PAINLEVEL_OUTOF10: 2
PAINLEVEL_OUTOF10: 2
PAINLEVEL_OUTOF10: 9
PAINLEVEL_OUTOF10: 0
PAINLEVEL_OUTOF10: 2
PAINLEVEL_OUTOF10: 0

## 2023-09-22 ASSESSMENT — PAIN DESCRIPTION - DESCRIPTORS
DESCRIPTORS: ACHING

## 2023-09-22 ASSESSMENT — PAIN - FUNCTIONAL ASSESSMENT: PAIN_FUNCTIONAL_ASSESSMENT: ACTIVITIES ARE NOT PREVENTED

## 2023-09-22 ASSESSMENT — PAIN DESCRIPTION - ORIENTATION
ORIENTATION: MID
ORIENTATION: MID

## 2023-09-22 ASSESSMENT — PAIN DESCRIPTION - LOCATION
LOCATION: BACK

## 2023-09-22 ASSESSMENT — PAIN SCALES - WONG BAKER: WONGBAKER_NUMERICALRESPONSE: 0

## 2023-09-22 NOTE — CARE COORDINATION
09/22/23 1535   Service Assessment   Patient Orientation Alert and Oriented   Cognition Alert   History Provided By Patient   Primary [facility]   Patient's Healthcare Decision Maker is: Legal Next of Kin   PCP Verified by CM Yes   Last Visit to PCP Within last 3 months  (Dr. Vijay Sanders 09/21/2023)   Prior Functional Level Assistance with the following:;Mobility  (Walker/wheelchair)   Current Functional Level Assistance with the following:;Mobility   Can patient return to prior living arrangement Yes   Ability to make needs known: Good   Family able to assist with home care needs: Other (comment)  (Patient lives alone)   Would you like for me to discuss the discharge plan with any other family members/significant others, and if so, who? Yes  (Daughter, Mellisa Stallings)   Financial Resources [employer] Resources None   Social/Functional History   Lives With Alone   Type of [address] [place] [address]  One level  (4 steps the basement entrance)   [address], [facility] Discharge   Transition of Care Consult (CM Consult) Discharge Planning;Home Health   Confirm Follow Up Transport Family     Readmission Assessment  Number of Days since last admission?: 8-30 days  Previous Disposition: Home Alone  Who is being Interviewed: Patient  What was the patient's/caregiver's perception as to why they think they needed to return back to the hospital?: Other (Comment) (Oncology Referred Patient to Hospital)  Did you visit your Primary Care Physician after you left the hospital, before you returned this time?: Yes  Did you see a specialist, such as Cardiac, Pulmonary, Orthopedic Physician, etc. after you left the hospital?: Yes  Who advised the patient to return to the hospital?: Physician  Does the patient report anything that got in the way of taking their medications?: No     CM met with patient at bedside for ZAKIYA Guerrero verified as accurate. Patient reports living alone in a one story home with 4 steps to the basement entrance. Patient relays being independent with ADLs, doing the best she can. DME: rolling walker and wheelchair. Current with [facility] . Choice letter received, placed on chart and referral sent. Medications obtained from [facility] and mail order. When medically stable for discharge, family will transport her home. CM will continue to follow patient and recs of medical team.    Current Dispo: Home with Shellie Dorsey.     Anticipated [facility] 09/23/2023

## 2023-09-22 NOTE — ED PROVIDER NOTES
[facility] EMERGENCY DEPT  EMERGENCY DEPARTMENT HISTORY AND PHYSICAL EXAM      Date: 9/22/2023  Patient Name: Zuri Caba  MRN: [id number]  [facility] [facility] 1945  Date of evaluation: 9/22/2023  Provider: OMA Figueroa NP   Note Started: 10:50 AM EDT 9/22/23    HISTORY OF PRESENT ILLNESS     Chief Complaint   Patient presents with    Anemia       History Provided By: Patient    HPI: Zuri Caba is a 65 y.o. female with medical history significant for multiple myeloma, chronic anemia requiring transfusions, hypertension and other history of use listed below presents from same-day surgery for admission due to multiple antibodies requiring special blood products that will likely not be available until tomorrow. Patient is seen by oncology and she was feeling weak and went in for her appointment and had labs drawn by outside lab company yesterday showing a hemoglobin of 6.5 which is consistent with her history. Dr. Kamila Macias, patient's hematologist oncologist ordered 2 units of blood. Patient denies any other symptoms other than fatigue and weakness that she has when her hemoglobin is this low. She denies chest pain or shortness of breath. PAST MEDICAL HISTORY   Past Medical History:  Past Medical History:   Diagnosis Date    Arthritis     GERD (gastroesophageal reflux disease)     Hypertension     Immunosuppression due to drug therapy ([address])     Multiple myeloma ([address])     Thromboembolus ([address])        Past Surgical History:  Past Surgical History:   Procedure Laterality Date    BONE MARROW TRANSPLANT      GYN      vaginal prolaps       Family History:  Family History   Problem Relation Age of Onset    Hypertension Mother     Hypertension Father        Social History:  Social History     Tobacco Use    Smoking status: Former    Smokeless tobacco: Never    Tobacco comments:     Quit smoking: quit 50 years ago   Substance Use Topics    Alcohol use: Never    Drug use: Never       Allergies:   Allergies Physical Exam  Vitals and nursing note reviewed. Constitutional:       General: She is not in acute distress. Appearance: Normal appearance. She is normal weight. She is not ill-appearing, toxic-appearing or diaphoretic. HENT:      Head: Normocephalic. Mouth/Throat:      Mouth: Mucous membranes are moist.   Eyes:      Conjunctiva/sclera: Conjunctivae normal.   Cardiovascular:      Rate and Rhythm: Normal rate and regular rhythm. Heart sounds: Normal heart sounds. Pulmonary:      Effort: Pulmonary effort is normal. No respiratory distress. Breath sounds: Normal breath sounds. Abdominal:      General: Bowel sounds are normal.      Palpations: Abdomen is soft. Tenderness: There is no abdominal tenderness. Musculoskeletal:         General: Normal range of motion. Cervical back: Neck supple. Right lower leg: Edema present. Left lower leg: Edema present. Comments: Chronic lower extremity nonpitting edema   Skin:     General: Skin is warm and dry. Neurological:      General: No focal deficit present. Mental Status: She is alert and oriented to person, place, and time.    Psychiatric:         Behavior: Behavior normal.         SCREENINGS                  LAB, EKG AND DIAGNOSTIC RESULTS   Labs:  Recent Results (from the past 12 hour(s))   TYPE AND SCREEN    Collection Time: 09/22/23  9:20 AM   Result Value Ref Range    Crossmatch expiration date 09/25/2023,2359     ABO/Rh PENDING     Antibody Screen PENDING    CBC with Auto Differential    Collection Time: 09/22/23 12:41 PM   Result Value Ref Range    WBC 9.2 3.6 - 11.0 K/uL    RBC 1.95 (L) 3.80 - 5.20 M/uL    Hemoglobin 6.1 (L) 11.5 - 16.0 g/dL    Hematocrit 19.0 (L) 35.0 - 47.0 %    MCV 97.4 80.0 - 99.0 FL    MCH 31.3 26.0 - 34.0 PG    MCHC 32.1 30.0 - 36.5 g/dL    RDW 20.4 (H) 11.5 - 14.5 %    Platelets 57 (L) 447 - 400 K/uL    MPV 11.3 8.9 - 12.9 FL    Nucleated RBCs 0.5 (H) 0.0  WBC    nRBC 0.05 (H)

## 2023-09-22 NOTE — PROGRESS NOTES
Blood bank called and states patient has antibioties, and states patient may not be able to receive blood transfusion to day. Dr Dmitry Thomas office called. Spoke with Sommer Pierson RN. She states to send patient to ER due to H&H, and patient weakness. ER called to make aware. Patient to go to Room #23. Spoke with patient and she verbally understood.

## 2023-09-22 NOTE — ED TRIAGE NOTES
Received pt from Same Day Surgery where she has been since approx 0800 this morning. She has a hx of multiple myeloma with chronic anemia and was there to receive a transfusion of 2 units of blood due to a hemoglobin of 6.3 that was obtained yesterday. She has multiple antibodies and the blood has beenordered but is not expected to be available until tomorrow. She was sent to the ER for admission due to this delay by order of Dr. Terrance Byrne ([phone]). Blood consent has already been obtained and labs have already been drawn.

## 2023-09-22 NOTE — PROGRESS NOTES
Patient transported via hospital bed with all of her belongings to ER #23 in stable condition. Report give to SONJA Montanez.

## 2023-09-22 NOTE — PROGRESS NOTES
Received care of patient from home via wheelchair. States she does not feel well. Patient here for 2 Units of PRBC's for H&H of 6.3/19.9. Patient weak. Assisted to bed and settled in for comfort. Pure Wick applied per patient request. Warm blankets, food/drink, and call bell in reach.

## 2023-09-22 NOTE — PROGRESS NOTES
4 Eyes Skin Assessment     NAME:  Martín Mancini  YOB: 1945  MEDICAL RECORD NUMBER:  [id number]    The patient is being assessed for  Admission    I agree that at least one RN has performed a thorough Head to Toe Skin Assessment on the patient. ALL assessment sites listed below have been assessed. Areas assessed by both nurses:    Head, Face, Ears, Shoulders, Back, Chest, Arms, Elbows, Hands, Sacrum. Buttock, Coccyx, Ischium, Legs. Feet and Heels, and Under Medical Devices         Does the Patient have a Wound?  No noted wound(s)       Matteo Prevention initiated by RN: No  Wound Care Orders initiated by RN: No    Pressure Injury (Stage 3,4, Unstageable, DTI, NWPT, and Complex wounds) if present, place Wound referral order by RN under : No    New Ostomies, if present place, Ostomy referral order under : No     Nurse 1 eSignature: Electronically signed by Gareth Vu RN on 9/22/23 at 4:19 PM EDT    **SHARE this note so that the co-signing nurse can place an eSignature**    Nurse 2 eSignature: Electronically signed by Gareth Vu RN on 9/22/23 at 4:20 PM EDT  Katiuska Kaiser   9/22/2023 [id number]

## 2023-09-23 VITALS
OXYGEN SATURATION: 96 % | DIASTOLIC BLOOD PRESSURE: 62 MMHG | HEART RATE: 72 BPM | HEIGHT: 62 IN | RESPIRATION RATE: 18 BRPM | WEIGHT: 120 LBS | BODY MASS INDEX: 22.08 KG/M2 | SYSTOLIC BLOOD PRESSURE: 132 MMHG | TEMPERATURE: 98.4 F

## 2023-09-23 LAB
ABO + RH BLD: NORMAL
ANTIGENS PRESENT RBC DONR: NORMAL
ANTIGENS PRESENT RBC DONR: NORMAL
BASOPHILS # BLD: 0.2 K/UL (ref 0–0.1)
BASOPHILS NFR BLD: 2 % (ref 0–1)
BLD PROD TYP BPU: NORMAL
BLD PROD TYP BPU: NORMAL
BLOOD BANK CMNT PATIENT-IMP: NORMAL
BLOOD BANK DISPENSE STATUS: NORMAL
BLOOD BANK DISPENSE STATUS: NORMAL
BLOOD GROUP ANTIBODIES SERPL: NORMAL
BLOOD GROUP ANTIBODIES SERPL: POSITIVE
BPU ID: NORMAL
BPU ID: NORMAL
CROSSMATCH RESULT: NORMAL
CROSSMATCH RESULT: NORMAL
DIFFERENTIAL METHOD BLD: ABNORMAL
EOSINOPHIL # BLD: 0 K/UL (ref 0–0.4)
EOSINOPHIL NFR BLD: 0 % (ref 0–7)
ERYTHROCYTE [DISTWIDTH] IN BLOOD BY AUTOMATED COUNT: 21.1 % (ref 11.5–14.5)
HCT VFR BLD AUTO: 25.9 % (ref 35–47)
HGB BLD-MCNC: 8.5 G/DL (ref 11.5–16)
IMM GRANULOCYTES # BLD AUTO: 0 K/UL
IMM GRANULOCYTES NFR BLD AUTO: 0 %
LYMPHOCYTES # BLD: 3.3 K/UL (ref 0.8–3.5)
LYMPHOCYTES NFR BLD: 44 % (ref 12–49)
MCH RBC QN AUTO: 29.5 PG (ref 26–34)
MCHC RBC AUTO-ENTMCNC: 32.8 G/DL (ref 30–36.5)
MCV RBC AUTO: 89.9 FL (ref 80–99)
MONOCYTES # BLD: 1.7 K/UL (ref 0–1)
MONOCYTES NFR BLD: 23 % (ref 5–13)
MYELOCYTES NFR BLD MANUAL: 2 %
NEUTS SEG # BLD: 2.2 K/UL (ref 1.8–8)
NEUTS SEG NFR BLD: 29 % (ref 32–75)
NRBC # BLD: 0.04 K/UL (ref 0–0.01)
NRBC BLD-RTO: 0.5 PER 100 WBC
PLATELET # BLD AUTO: 53 K/UL (ref 150–400)
PMV BLD AUTO: 10.6 FL (ref 8.9–12.9)
RBC # BLD AUTO: 2.88 M/UL (ref 3.8–5.2)
RBC MORPH BLD: ABNORMAL
RBC MORPH BLD: ABNORMAL
SPECIMEN EXP DATE BLD: NORMAL
TRANSFUSION STATUS PATIENT QL: NORMAL
TRANSFUSION STATUS PATIENT QL: NORMAL
UNIT DIVISION: 0
UNIT DIVISION: 0
WBC # BLD AUTO: 7.6 K/UL (ref 3.6–11)

## 2023-09-23 PROCEDURE — 6370000000 HC RX 637 (ALT 250 FOR IP): Performed by: INTERNAL MEDICINE

## 2023-09-23 PROCEDURE — 6360000002 HC RX W HCPCS: Performed by: INTERNAL MEDICINE

## 2023-09-23 PROCEDURE — 36415 COLL VENOUS BLD VENIPUNCTURE: CPT

## 2023-09-23 PROCEDURE — 85025 COMPLETE CBC W/AUTO DIFF WBC: CPT

## 2023-09-23 RX ORDER — DIPHENHYDRAMINE HCL 25 MG
25 CAPSULE ORAL ONCE
Status: COMPLETED | OUTPATIENT
Start: 2023-09-23 | End: 2023-09-23

## 2023-09-23 RX ORDER — ACETAMINOPHEN 500 MG
1000 TABLET ORAL ONCE
Status: COMPLETED | OUTPATIENT
Start: 2023-09-23 | End: 2023-09-23

## 2023-09-23 RX ADMIN — DIPHENHYDRAMINE HYDROCHLORIDE 25 MG: 50 INJECTION INTRAMUSCULAR; INTRAVENOUS at 01:14

## 2023-09-23 RX ADMIN — CARVEDILOL 3.12 MG: 3.12 TABLET, FILM COATED ORAL at 09:39

## 2023-09-23 RX ADMIN — ACETAMINOPHEN 1000 MG: 500 TABLET ORAL at 11:48

## 2023-09-23 RX ADMIN — DIPHENHYDRAMINE HYDROCHLORIDE 25 MG: 25 CAPSULE ORAL at 11:48

## 2023-09-23 RX ADMIN — ALLOPURINOL 300 MG: 100 TABLET ORAL at 09:40

## 2023-09-23 ASSESSMENT — PAIN SCALES - GENERAL: PAINLEVEL_OUTOF10: 0

## 2023-09-23 NOTE — CARE COORDINATION
1350: Discharge summary/order home noted and attached in [address] Patient is current with [facility]. Order modified. When transportation available, patient to discharge per order. Transition of Care Plan:    RUR: 17%  Prior Level of Functioning: Assistance with DME  Disposition: Home with [facility]   If SNF or IPR: Date FOC offered: N/A  Date FOC received: N/A  Accepting facility: N/A  Date authorization started with reference number: N/A  Date authorization received and expires: N/A  Follow up appointments: Discharge Summary  DME needed: Has DME needed  Transportation at discharge: Family  IM/IMM Medicare/ letter given: 09/22/2023  Is patient a [place] and connected with [facility]? No   If yes, was [employer] transfer form completed and [facility] notified? N/A  Caregiver Contact: Patient  Discharge Caregiver contacted prior to discharge? Patient   Care Conference needed?  No  Barriers to discharge: None

## 2023-09-23 NOTE — DISCHARGE SUMMARY
09/22/2023  Value: 0           Ref range: 0 - 7 %            Status: Final  Basophils %                                   Date: 09/22/2023  Value: 0           Ref range: 0 - 1 %            Status: Final  Immature Granulocytes                         Date: 09/22/2023  Value: 0           Ref range: %                  Status: Final  Neutrophils Absolute                          Date: 09/22/2023  Value: 3.9         Ref range: 1.8 - 8.0 K/UL     Status: Final  Lymphocytes Absolute                          Date: 09/22/2023  Value: 4.6 (H)     Ref range: 0.8 - 3.5 K/UL     Status: Final  Monocytes Absolute                            Date: 09/22/2023  Value: 0.7         Ref range: 0.0 - 1.0 K/UL     Status: Final  Eosinophils Absolute                          Date: 09/22/2023  Value: 0.0         Ref range: 0.0 - 0.4 K/UL     Status: Final  Basophils Absolute                            Date: 09/22/2023  Value: 0.0         Ref range: 0.0 - 0.1 K/UL     Status: Final  Absolute Immature Granulocyte                 Date: 09/22/2023  Value: 0.0         Ref range: K/UL               Status: Final  Differential Type                             Date: 09/22/2023  Value: Manual      Ref range:                    Status: Final  Platelet Comment                              Date: 09/22/2023  Value: DECREASED PLATELETS                       Status: Final  RBC Comment                                   Date: 09/22/2023  Value: Normocytic, Normochromic                     Ref range:                    Status: Final  Sodium                                        Date: 09/22/2023  Value: 142         Ref range: 136 - 145 mmol/L   Status: Final  Potassium                                     Date: 09/22/2023  Value: 3.5         Ref range: 3.5 - 5.1 mmol/L   Status: Final  Chloride                                      Date: 09/22/2023  Value: 107         Ref range: 97 - 108 mmol/L    Status: Final  CO2 Ref range: 8.9 - 12.9 FL      Status: Final  Nucleated RBCs                                Date: 09/23/2023  Value: 0.5 (H)     Ref range: 0.0  WBC    Status: Final  nRBC                                          Date: 09/23/2023  Value: 0.04 (H)    Ref range: 0.00 - 0.01 K/uL   Status: Final  Neutrophils %                                 Date: 09/23/2023  Value: 29 (L)      Ref range: 32 - 75 %          Status: Final  Lymphocytes %                                 Date: 09/23/2023  Value: 44          Ref range: 12 - 49 %          Status: Final  Monocytes %                                   Date: 09/23/2023  Value: 23 (H)      Ref range: 5 - 13 %           Status: Final  Eosinophils %                                 Date: 09/23/2023  Value: 0           Ref range: 0 - 7 %            Status: Final  Basophils %                                   Date: 09/23/2023  Value: 2 (H)       Ref range: 0 - 1 %            Status: Final  Myelocytes                                    Date: 09/23/2023  Value: 2 (H)       Ref range: 0 %                Status: Final  Immature Granulocytes                         Date: 09/23/2023  Value: 0           Ref range: %                  Status: Final  Neutrophils Absolute                          Date: 09/23/2023  Value: 2.2         Ref range: 1.8 - 8.0 K/UL     Status: Final  Lymphocytes Absolute                          Date: 09/23/2023  Value: 3.3         Ref range: 0.8 - 3.5 K/UL     Status: Final  Monocytes Absolute                            Date: 09/23/2023  Value: 1.7 (H)     Ref range: 0.0 - 1.0 K/UL     Status: Final  Eosinophils Absolute                          Date: 09/23/2023  Value: 0.0         Ref range: 0.0 - 0.4 K/UL     Status: Final  Basophils Absolute                            Date: 09/23/2023  Value: 0.2 (H)     Ref range: 0.0 - 0.1 K/UL     Status: Final  Absolute Immature Granulocyte                 Date: 09/23/2023  Value: 0.0         Ref range: K/UL

## 2023-09-23 NOTE — PROGRESS NOTES
Discharge plan of care/case management plan validated with provider discharge order. Discharge order noted and discharge summary in. Discharge discussed with provider. Discharge instructions and medications reviewed with patient. Patient verbalized understanding and expressed no concerns. IV access removed. No telemetry box. Patient taken to front entrance in wheel chair at 1430 and transported home by family member.

## 2023-09-23 NOTE — CONSENT
Informed Consent for Blood Component Transfusion Note    I have discussed with the patient the rationale for blood component transfusion; its benefits in treating or preventing fatigue, organ damage, or death; and its risk which includes mild transfusion reactions, rare risk of blood borne infection, or more serious but rare reactions. I have discussed the alternatives to transfusion, including the risk and consequences of not receiving transfusion. The patient had an opportunity to ask questions and had agreed to proceed with transfusion of blood components.     Electronically signed by Sherry Villavicencio MD on 9/23/23 at 7:52 AM EDT

## 2023-10-16 ENCOUNTER — HOSPITAL ENCOUNTER (OUTPATIENT)
Facility: HOSPITAL | Age: 78
Setting detail: INFUSION SERIES
End: 2023-10-16
Payer: MEDICARE

## 2023-10-16 VITALS
TEMPERATURE: 97.7 F | WEIGHT: 120.15 LBS | OXYGEN SATURATION: 96 % | HEART RATE: 84 BPM | HEIGHT: 66 IN | BODY MASS INDEX: 19.31 KG/M2 | SYSTOLIC BLOOD PRESSURE: 120 MMHG | DIASTOLIC BLOOD PRESSURE: 66 MMHG | RESPIRATION RATE: 18 BRPM

## 2023-10-16 PROCEDURE — 86156 COLD AGGLUTININ SCREEN: CPT

## 2023-10-16 PROCEDURE — 86904 BLOOD TYPING PATIENT SERUM: CPT

## 2023-10-16 PROCEDURE — 6370000000 HC RX 637 (ALT 250 FOR IP): Performed by: INTERNAL MEDICINE

## 2023-10-16 PROCEDURE — 86920 COMPATIBILITY TEST SPIN: CPT

## 2023-10-16 PROCEDURE — 86880 COOMBS TEST DIRECT: CPT

## 2023-10-16 PROCEDURE — 36430 TRANSFUSION BLD/BLD COMPNT: CPT

## 2023-10-16 PROCEDURE — 86900 BLOOD TYPING SEROLOGIC ABO: CPT

## 2023-10-16 PROCEDURE — 86850 RBC ANTIBODY SCREEN: CPT

## 2023-10-16 PROCEDURE — 36415 COLL VENOUS BLD VENIPUNCTURE: CPT

## 2023-10-16 PROCEDURE — 86870 RBC ANTIBODY IDENTIFICATION: CPT

## 2023-10-16 PROCEDURE — 86901 BLOOD TYPING SEROLOGIC RH(D): CPT

## 2023-10-16 PROCEDURE — 86978 RBC PRETREATMENT SERUM: CPT

## 2023-10-16 PROCEDURE — P9073 PLATELETS PHERESIS PATH REDU: HCPCS

## 2023-10-16 PROCEDURE — 86922 COMPATIBILITY TEST ANTIGLOB: CPT

## 2023-10-16 RX ORDER — ACETAMINOPHEN 325 MG/1
650 TABLET ORAL SEE ADMIN INSTRUCTIONS
Status: COMPLETED | OUTPATIENT
Start: 2023-10-16 | End: 2023-10-16

## 2023-10-16 RX ORDER — SODIUM CHLORIDE 9 MG/ML
INJECTION, SOLUTION INTRAVENOUS PRN
Status: DISCONTINUED | OUTPATIENT
Start: 2023-10-16 | End: 2023-10-29 | Stop reason: HOSPADM

## 2023-10-16 RX ADMIN — ACETAMINOPHEN 650 MG: 325 TABLET ORAL at 13:46

## 2023-10-16 NOTE — PROGRESS NOTES
Discharge instructions/education given to patient, she verbalized understanding and had no questions. Patient was discharged in wheelchair at main entrance of hospital to home with family via private vehicle.

## 2023-10-16 NOTE — PROGRESS NOTES
[facility] into [facility] in [facility] with daughter for 1 bag of platelets and 1 unit of packed RBC's. Assisted patient into bed. Entered orders into Epic, stated patient has antibodies so notified blood bank ([facility]), he stated he needed 2-3 extra pink tubes to send to [address] IV obtained as well as vitals.

## 2023-10-16 NOTE — PROGRESS NOTES
Notified Dr. Sebastian Stout office to get name of patient's treatment medication for multiple myeloma so that Marivel Chavez in blood bank could let [facility] know since patient has new antibodies. Patient will be returning tomorrow 10/17/23 for packed RBC's.

## 2023-10-17 ENCOUNTER — HOSPITAL ENCOUNTER (OUTPATIENT)
Facility: HOSPITAL | Age: 78
End: 2023-10-17
Attending: INTERNAL MEDICINE | Admitting: INTERNAL MEDICINE
Payer: MEDICARE

## 2023-10-17 VITALS
SYSTOLIC BLOOD PRESSURE: 121 MMHG | HEART RATE: 88 BPM | DIASTOLIC BLOOD PRESSURE: 71 MMHG | OXYGEN SATURATION: 97 % | TEMPERATURE: 98.7 F | RESPIRATION RATE: 18 BRPM

## 2023-10-17 LAB
BLD PROD TYP BPU: NORMAL
BLOOD BANK DISPENSE STATUS: NORMAL
BPU ID: NORMAL
TRANSFUSION STATUS PATIENT QL: NORMAL
UNIT DIVISION: 0

## 2023-10-17 PROCEDURE — 6370000000 HC RX 637 (ALT 250 FOR IP): Performed by: INTERNAL MEDICINE

## 2023-10-17 PROCEDURE — 36430 TRANSFUSION BLD/BLD COMPNT: CPT

## 2023-10-17 PROCEDURE — P9016 RBC LEUKOCYTES REDUCED: HCPCS

## 2023-10-17 RX ORDER — SODIUM CHLORIDE 9 MG/ML
INJECTION, SOLUTION INTRAVENOUS PRN
Status: ACTIVE | OUTPATIENT
Start: 2023-10-17

## 2023-10-17 RX ORDER — ACETAMINOPHEN 325 MG/1
650 TABLET ORAL
Status: COMPLETED | OUTPATIENT
Start: 2023-10-17 | End: 2023-10-17

## 2023-10-17 RX ADMIN — ACETAMINOPHEN 650 MG: 325 TABLET ORAL at 12:34

## 2023-10-17 NOTE — PROGRESS NOTES
Patient alert and oriented x 4 with respirations even and unlabored on RA no signs or symptoms of distress noted and call bell within reach. Bedside face to face report given to Talita Gonzalez for continuation of care.

## 2023-10-17 NOTE — PERIOP NOTE
Transfusion complete, tolerated well. Discharge instructions reviewed, verbalizes understanding. No complaints at this time. Discharged via wheelchair to private vehicle with daughter.

## 2023-10-18 LAB
ABO + RH BLD: NORMAL
BLD PROD TYP BPU: NORMAL
BLOOD BANK CMNT PATIENT-IMP: NORMAL
BLOOD BANK DISPENSE STATUS: NORMAL
BLOOD GROUP ANTIBODIES SERPL: NORMAL
BLOOD GROUP ANTIBODIES SERPL: POSITIVE
BPU ID: NORMAL
CROSSMATCH RESULT: NORMAL
SPECIMEN EXP DATE BLD: NORMAL
TRANSFUSION STATUS PATIENT QL: NORMAL
UNIT DIVISION: 0

## 2023-11-22 ENCOUNTER — HOSPITAL ENCOUNTER (OUTPATIENT)
Facility: HOSPITAL | Age: 78
Discharge: HOME | End: 2023-11-25
Payer: MEDICARE

## 2023-11-22 ENCOUNTER — TRANSCRIBE ORDERS (OUTPATIENT)
Facility: HOSPITAL | Age: 78
End: 2023-11-22

## 2023-11-22 DIAGNOSIS — N91.2 AMENIA: ICD-10-CM

## 2023-11-22 DIAGNOSIS — N91.2 AMENIA: Primary | ICD-10-CM

## 2023-11-22 PROCEDURE — 86870 RBC ANTIBODY IDENTIFICATION: CPT

## 2023-11-22 PROCEDURE — 86880 COOMBS TEST DIRECT: CPT

## 2023-11-22 PROCEDURE — 86850 RBC ANTIBODY SCREEN: CPT

## 2023-11-22 PROCEDURE — 86920 COMPATIBILITY TEST SPIN: CPT

## 2023-11-22 PROCEDURE — 86900 BLOOD TYPING SEROLOGIC ABO: CPT

## 2023-11-22 PROCEDURE — 86901 BLOOD TYPING SEROLOGIC RH(D): CPT

## 2023-11-24 ENCOUNTER — HOSPITAL ENCOUNTER (OUTPATIENT)
Facility: HOSPITAL | Age: 78
Setting detail: INFUSION SERIES
Discharge: HOME | End: 2023-11-24
Payer: MEDICARE

## 2023-11-24 VITALS
OXYGEN SATURATION: 95 % | RESPIRATION RATE: 16 BRPM | TEMPERATURE: 97.5 F | HEART RATE: 69 BPM | SYSTOLIC BLOOD PRESSURE: 115 MMHG | DIASTOLIC BLOOD PRESSURE: 60 MMHG

## 2023-11-24 LAB
HCT VFR BLD AUTO: 22.3 % (ref 35–47)
HGB BLD-MCNC: 6.7 G/DL (ref 11.5–16)

## 2023-11-24 PROCEDURE — 85018 HEMOGLOBIN: CPT

## 2023-11-24 PROCEDURE — 85014 HEMATOCRIT: CPT

## 2023-11-24 PROCEDURE — 6370000000 HC RX 637 (ALT 250 FOR IP): Performed by: INTERNAL MEDICINE

## 2023-11-24 PROCEDURE — 36415 COLL VENOUS BLD VENIPUNCTURE: CPT

## 2023-11-24 PROCEDURE — P9016 RBC LEUKOCYTES REDUCED: HCPCS

## 2023-11-24 PROCEDURE — 36430 TRANSFUSION BLD/BLD COMPNT: CPT

## 2023-11-24 PROCEDURE — 86922 COMPATIBILITY TEST ANTIGLOB: CPT

## 2023-11-24 PROCEDURE — 86921 COMPATIBILITY TEST INCUBATE: CPT

## 2023-11-24 PROCEDURE — 86902 BLOOD TYPE ANTIGEN DONOR EA: CPT

## 2023-11-24 RX ORDER — ACETAMINOPHEN 325 MG/1
650 TABLET ORAL ONCE
Status: COMPLETED | OUTPATIENT
Start: 2023-11-24 | End: 2023-11-24

## 2023-11-24 RX ORDER — SODIUM CHLORIDE 9 MG/ML
INJECTION, SOLUTION INTRAVENOUS PRN
Status: DISCONTINUED | OUTPATIENT
Start: 2023-11-24 | End: 2023-11-25 | Stop reason: HOSPADM

## 2023-11-24 RX ADMIN — ACETAMINOPHEN 650 MG: 325 TABLET ORAL at 09:37

## 2023-11-24 ASSESSMENT — PAIN SCALES - GENERAL: PAINLEVEL_OUTOF10: 0

## 2023-11-24 ASSESSMENT — PAIN DESCRIPTION - LOCATION: LOCATION: OTHER (COMMENT)

## 2023-11-24 NOTE — DISCHARGE INSTRUCTIONS
Follow up , on 11- Sunday  come  to  hospital for  type  and  screen  for  another  unit  of  blood  to be  given on  Monday when  available

## 2023-11-24 NOTE — PROGRESS NOTES
Paged DR kong advised  hgb  6.7  today  was  8.5 on  wed pt  getting  one  unit today  blood bank only has  one pt  has  antibodies , DR Deon Lux   orderred to   get  another unit  on  Monday  advised  need  to  have blood  retyped  and  crossed  on  Sunday  to   receive  blood on Monday  when  blood  is  available pt  advised  see order for  one  unit  on  Monday

## 2023-11-24 NOTE — PROGRESS NOTES
Arrived  today  with  daughter  assisted  to  room  34  in  wheelchair  to  room  ambulated  self  to  bed  verified  name    and  reason  for  visit  pt  states  here  for  blood  transfusion  2 units had  type and  cross done   iv  started left  ac   and  h and  h  drawn   due  to  last hgb  was  drawn on  23  blood  sent  to lab and  blood  bank  states  have one  unit  pt  has  antibodies advised  pt  has one  unit  ready  pt  states  should  have 2  dr ordered two  phone  call to  blood  bank advised spoke  with  Edsel Babinski states pt  has  one  unit  [facility] only [facility] one  unit  DR dilan martinez  notifed

## 2023-11-25 LAB
ABO + RH BLD: NORMAL
ANTIGENS PRESENT RBC DONR: NORMAL
BLD PROD TYP BPU: NORMAL
BLOOD BANK CMNT PATIENT-IMP: NORMAL
BLOOD BANK DISPENSE STATUS: NORMAL
BLOOD GROUP ANTIBODIES SERPL: NORMAL
BLOOD GROUP ANTIBODIES SERPL: POSITIVE
BPU ID: NORMAL
CROSSMATCH RESULT: NORMAL
DAT POLY-SP REAG RBC QL: NEGATIVE
SPECIMEN EXP DATE BLD: NORMAL
TRANSFUSION STATUS PATIENT QL: NORMAL
UNIT DIVISION: 0

## 2023-11-26 ENCOUNTER — HOSPITAL ENCOUNTER (EMERGENCY)
Facility: HOSPITAL | Age: 78
Discharge: HOME | End: 2023-11-26
Attending: EMERGENCY MEDICINE
Payer: MEDICARE

## 2023-11-26 VITALS
HEIGHT: 66 IN | WEIGHT: 102 LBS | DIASTOLIC BLOOD PRESSURE: 76 MMHG | RESPIRATION RATE: 14 BRPM | BODY MASS INDEX: 16.39 KG/M2 | SYSTOLIC BLOOD PRESSURE: 120 MMHG | OXYGEN SATURATION: 99 % | HEART RATE: 87 BPM | TEMPERATURE: 98.4 F

## 2023-11-26 DIAGNOSIS — D64.9 ANEMIA, UNSPECIFIED TYPE: Primary | ICD-10-CM

## 2023-11-26 LAB
ALBUMIN SERPL-MCNC: 3.2 G/DL (ref 3.5–5)
ALBUMIN/GLOB SERPL: 1.1 (ref 1.1–2.2)
ALP SERPL-CCNC: 157 U/L (ref 45–117)
ALT SERPL-CCNC: 22 U/L (ref 12–78)
ANION GAP SERPL CALC-SCNC: 7 MMOL/L (ref 5–15)
AST SERPL W P-5'-P-CCNC: 26 U/L (ref 15–37)
BASOPHILS # BLD: 0 K/UL (ref 0–0.1)
BASOPHILS NFR BLD: 0 % (ref 0–1)
BILIRUB SERPL-MCNC: 0.6 MG/DL (ref 0.2–1)
BUN SERPL-MCNC: 15 MG/DL (ref 6–20)
BUN/CREAT SERPL: 22 (ref 12–20)
CA-I BLD-MCNC: 9.9 MG/DL (ref 8.5–10.1)
CHLORIDE SERPL-SCNC: 107 MMOL/L (ref 97–108)
CO2 SERPL-SCNC: 28 MMOL/L (ref 21–32)
CREAT SERPL-MCNC: 0.67 MG/DL (ref 0.55–1.02)
DIFFERENTIAL METHOD BLD: ABNORMAL
EOSINOPHIL # BLD: 0 K/UL (ref 0–0.4)
EOSINOPHIL NFR BLD: 0 % (ref 0–7)
ERYTHROCYTE [DISTWIDTH] IN BLOOD BY AUTOMATED COUNT: 25.2 % (ref 11.5–14.5)
GLOBULIN SER CALC-MCNC: 3 G/DL (ref 2–4)
GLUCOSE SERPL-MCNC: 115 MG/DL (ref 65–100)
HCT VFR BLD AUTO: 29.8 % (ref 35–47)
HGB BLD-MCNC: 9.3 G/DL (ref 11.5–16)
IMM GRANULOCYTES # BLD AUTO: 0 K/UL (ref 0–0.04)
IMM GRANULOCYTES NFR BLD AUTO: 0 % (ref 0–0.5)
LYMPHOCYTES # BLD: 0.9 K/UL (ref 0.8–3.5)
LYMPHOCYTES NFR BLD: 27 % (ref 12–49)
MCH RBC QN AUTO: 30.3 PG (ref 26–34)
MCHC RBC AUTO-ENTMCNC: 31.2 G/DL (ref 30–36.5)
MCV RBC AUTO: 97.1 FL (ref 80–99)
MONOCYTES # BLD: 0.4 K/UL (ref 0–1)
MONOCYTES NFR BLD: 13 % (ref 5–13)
NEUTS SEG # BLD: 2 K/UL (ref 1.8–8)
NEUTS SEG NFR BLD: 60 % (ref 32–75)
NRBC # BLD: 0.07 K/UL (ref 0–0.01)
NRBC BLD-RTO: 2.1 PER 100 WBC
PLATELET # BLD AUTO: 94 K/UL (ref 150–400)
PMV BLD AUTO: 12.1 FL (ref 8.9–12.9)
POTASSIUM SERPL-SCNC: 3.6 MMOL/L (ref 3.5–5.1)
PROT SERPL-MCNC: 6.2 G/DL (ref 6.4–8.2)
RBC # BLD AUTO: 3.07 M/UL (ref 3.8–5.2)
SODIUM SERPL-SCNC: 142 MMOL/L (ref 136–145)
WBC # BLD AUTO: 3.4 K/UL (ref 3.6–11)

## 2023-11-26 PROCEDURE — 99283 EMERGENCY DEPT VISIT LOW MDM: CPT

## 2023-11-26 PROCEDURE — 36415 COLL VENOUS BLD VENIPUNCTURE: CPT

## 2023-11-26 PROCEDURE — 86921 COMPATIBILITY TEST INCUBATE: CPT

## 2023-11-26 PROCEDURE — 80053 COMPREHEN METABOLIC PANEL: CPT

## 2023-11-26 PROCEDURE — 86920 COMPATIBILITY TEST SPIN: CPT

## 2023-11-26 PROCEDURE — 86901 BLOOD TYPING SEROLOGIC RH(D): CPT

## 2023-11-26 PROCEDURE — 85025 COMPLETE CBC W/AUTO DIFF WBC: CPT

## 2023-11-26 PROCEDURE — 86870 RBC ANTIBODY IDENTIFICATION: CPT

## 2023-11-26 PROCEDURE — 86922 COMPATIBILITY TEST ANTIGLOB: CPT

## 2023-11-26 PROCEDURE — 86900 BLOOD TYPING SEROLOGIC ABO: CPT

## 2023-11-26 PROCEDURE — 86850 RBC ANTIBODY SCREEN: CPT

## 2023-11-26 RX ORDER — SODIUM CHLORIDE 9 MG/ML
INJECTION, SOLUTION INTRAVENOUS PRN
Status: DISCONTINUED | OUTPATIENT
Start: 2023-11-26 | End: 2023-11-26 | Stop reason: HOSPADM

## 2023-11-26 ASSESSMENT — LIFESTYLE VARIABLES
HOW OFTEN DO YOU HAVE A DRINK CONTAINING ALCOHOL: NEVER
HOW MANY STANDARD DRINKS CONTAINING ALCOHOL DO YOU HAVE ON A TYPICAL DAY: PATIENT DOES NOT DRINK

## 2023-11-26 ASSESSMENT — PAIN - FUNCTIONAL ASSESSMENT: PAIN_FUNCTIONAL_ASSESSMENT: 0-10

## 2023-11-26 ASSESSMENT — PAIN SCALES - GENERAL: PAINLEVEL_OUTOF10: 0

## 2023-11-26 NOTE — DISCHARGE INSTRUCTIONS
Thank you! Thank you for allowing me to care for you in the emergency department. It is my goal to provide you with excellent care. If you have not received excellent quality care, please ask to speak to the nurse manager. Please fill out the survey that will come to you by mail or email since we listen to your feedback! Below you will find a list of your tests from today's visit. Should you have any questions, please do not hesitate to call the emergency department. Labs  Recent Results (from the past 12 hour(s))   CBC with Auto Differential    Collection Time: 11/26/23 12:28 PM   Result Value Ref Range    WBC 3.4 (L) 3.6 - 11.0 K/uL    RBC 3.07 (L) 3.80 - 5.20 M/uL    Hemoglobin 9.3 (L) 11.5 - 16.0 g/dL    Hematocrit 29.8 (L) 35.0 - 47.0 %    MCV 97.1 80.0 - 99.0 FL    MCH 30.3 26.0 - 34.0 PG    MCHC 31.2 30.0 - 36.5 g/dL    RDW 25.2 (H) 11.5 - 14.5 %    Platelets 94 (L) 678 - 400 K/uL    MPV 12.1 8.9 - 12.9 FL    Nucleated RBCs 2.1 (H) 0.0  WBC    nRBC 0.07 (H) 0.00 - 0.01 K/uL    Neutrophils % 60 32 - 75 %    Lymphocytes % 27 12 - 49 %    Monocytes % 13 5 - 13 %    Eosinophils % 0 0 - 7 %    Basophils % 0 0 - 1 %    Immature Granulocytes 0 0 - 0.5 %    Neutrophils Absolute 2.0 1.8 - 8.0 K/UL    Lymphocytes Absolute 0.9 0.8 - 3.5 K/UL    Monocytes Absolute 0.4 0.0 - 1.0 K/UL    Eosinophils Absolute 0.0 0.0 - 0.4 K/UL    Basophils Absolute 0.0 0.0 - 0.1 K/UL    Absolute Immature Granulocyte 0.0 0.00 - 0.04 K/UL    Differential Type AUTOMATED         Radiologic Studies  No orders to display     ------------------------------------------------------------------------------------------------------------  The exam and treatment you received in the Emergency Department were for an urgent problem and are not intended as complete care.  It is important that you follow-up with a doctor, nurse practitioner, or physician assistant to:  (1) confirm your diagnosis,  (2) re-evaluation of changes in your

## 2023-11-26 NOTE — ED PROVIDER NOTES
[facility] EMERGENCY DEPT  EMERGENCY DEPARTMENT HISTORY AND PHYSICAL EXAM      Date: 11/26/2023  Patient Name: Dominik Richardson  MRN: [id number]  [address] [facility] 1945  Date of evaluation: 11/26/2023  Provider: Willow Jacobo MD   Note Started: 12:46 PM EST 11/26/23    HISTORY OF PRESENT ILLNESS     Chief Complaint   Patient presents with    Labs Only              History Provided By: Patient    HPI: Dominik Richardson is a 77 y.o. female patient with a history of multiple myeloma follows with oncology. Recently here in the hospital was going to get a blood transfusion and received it and discharged home. Patient comes for prescreening for her lab work to go to possible same-day for another blood transfusion on Monday. She has no complaints no chest pain or shortness of breath no weakness no black or bloody stool. PAST MEDICAL HISTORY   Past Medical History:  Past Medical History:   Diagnosis Date    Arthritis     GERD (gastroesophageal reflux disease)     Hypertension     Immunosuppression due to drug therapy ([address])     Multiple myeloma ([address])     Thromboembolus ([address])        Past Surgical History:  Past Surgical History:   Procedure Laterality Date    BONE MARROW TRANSPLANT      GYN      vaginal prolaps       Family History:  Family History   Problem Relation Age of Onset    Hypertension Mother     Hypertension Father        Social History:  Social History     Tobacco Use    Smoking status: Former    Smokeless tobacco: Never    Tobacco comments:     Quit smoking: quit 50 years ago   Substance Use Topics    Alcohol use: Never    Drug use: Never       Allergies:   Allergies   Allergen Reactions    Decamethonium Other (See Comments)     She thinks it made her bleed       PCP: Ruddy Baregr MD    Current Meds:   Current Facility-Administered Medications   Medication Dose Route Frequency Provider Last Rate Last Admin    0.9 % sodium chloride infusion   IntraVENous PRN Willow Jacobo MD         Current Outpatient

## 2024-07-10 ENCOUNTER — APPOINTMENT (OUTPATIENT)
Facility: HOSPITAL | Age: 79
End: 2024-07-10
Payer: MEDICARE

## 2024-07-10 ENCOUNTER — APPOINTMENT (OUTPATIENT)
Facility: HOSPITAL | Age: 79
End: 2024-07-10
Attending: INTERNAL MEDICINE
Payer: MEDICARE

## 2024-07-10 ENCOUNTER — HOSPITAL ENCOUNTER (INPATIENT)
Facility: HOSPITAL | Age: 79
LOS: 5 days | Discharge: HOME | End: 2024-07-15
Attending: INTERNAL MEDICINE | Admitting: INTERNAL MEDICINE
Payer: MEDICARE

## 2024-07-10 DIAGNOSIS — J90 PLEURAL EFFUSION: ICD-10-CM

## 2024-07-10 DIAGNOSIS — M54.50 BACK PAIN OF THORACOLUMBAR REGION: ICD-10-CM

## 2024-07-10 DIAGNOSIS — M54.6 BACK PAIN OF THORACOLUMBAR REGION: ICD-10-CM

## 2024-07-10 DIAGNOSIS — R60.0 BILATERAL LOWER EXTREMITY EDEMA: ICD-10-CM

## 2024-07-10 DIAGNOSIS — R06.02 SHORTNESS OF BREATH: Primary | ICD-10-CM

## 2024-07-10 LAB
ALBUMIN SERPL-MCNC: 3.4 G/DL (ref 3.5–5)
ALBUMIN/GLOB SERPL: 1.6 (ref 1.1–2.2)
ALP SERPL-CCNC: 166 U/L (ref 45–117)
ALT SERPL-CCNC: 41 U/L (ref 12–78)
ANION GAP SERPL CALC-SCNC: 3 MMOL/L (ref 5–15)
APPEARANCE UR: ABNORMAL
AST SERPL W P-5'-P-CCNC: 31 U/L (ref 15–37)
BACTERIA URNS QL MICRO: NEGATIVE /HPF
BASOPHILS # BLD: 0 K/UL (ref 0–0.1)
BASOPHILS NFR BLD: 0 % (ref 0–1)
BILIRUB SERPL-MCNC: 0.4 MG/DL (ref 0.2–1)
BILIRUB UR QL: NEGATIVE
BNP SERPL-MCNC: 854 PG/ML
BUN SERPL-MCNC: 26 MG/DL (ref 6–20)
BUN/CREAT SERPL: 35 (ref 12–20)
CA-I BLD-MCNC: 9.8 MG/DL (ref 8.5–10.1)
CAOX CRY URNS QL MICRO: ABNORMAL
CHLORIDE SERPL-SCNC: 107 MMOL/L (ref 97–108)
CO2 SERPL-SCNC: 36 MMOL/L (ref 21–32)
COLOR UR: ABNORMAL
CREAT SERPL-MCNC: 0.74 MG/DL (ref 0.55–1.02)
DIFFERENTIAL METHOD BLD: ABNORMAL
EKG ATRIAL RATE: 75 BPM
EKG DIAGNOSIS: NORMAL
EKG P AXIS: 35 DEGREES
EKG P-R INTERVAL: 172 MS
EKG Q-T INTERVAL: 302 MS
EKG QRS DURATION: 84 MS
EKG QTC CALCULATION (BAZETT): 337 MS
EKG R AXIS: -31 DEGREES
EKG T AXIS: 4 DEGREES
EKG VENTRICULAR RATE: 75 BPM
EOSINOPHIL # BLD: 0 K/UL (ref 0–0.4)
EOSINOPHIL NFR BLD: 0 % (ref 0–7)
EPITH CASTS URNS QL MICRO: ABNORMAL /LPF
ERYTHROCYTE [DISTWIDTH] IN BLOOD BY AUTOMATED COUNT: 15.2 % (ref 11.5–14.5)
GLOBULIN SER CALC-MCNC: 2.1 G/DL (ref 2–4)
GLUCOSE SERPL-MCNC: 155 MG/DL (ref 65–100)
GLUCOSE UR STRIP.AUTO-MCNC: NEGATIVE MG/DL
HCT VFR BLD AUTO: 37 % (ref 35–47)
HGB BLD-MCNC: 11.3 G/DL (ref 11.5–16)
HGB UR QL STRIP: NEGATIVE
HYALINE CASTS URNS QL MICRO: ABNORMAL /LPF (ref 0–5)
IMM GRANULOCYTES # BLD AUTO: 0 K/UL (ref 0–0.04)
IMM GRANULOCYTES NFR BLD AUTO: 0 % (ref 0–0.5)
KETONES UR QL STRIP.AUTO: NEGATIVE MG/DL
LEUKOCYTE ESTERASE UR QL STRIP.AUTO: NEGATIVE
LYMPHOCYTES # BLD: 1.1 K/UL (ref 0.8–3.5)
LYMPHOCYTES NFR BLD: 30 % (ref 12–49)
MCH RBC QN AUTO: 31.1 PG (ref 26–34)
MCHC RBC AUTO-ENTMCNC: 30.5 G/DL (ref 30–36.5)
MCV RBC AUTO: 101.9 FL (ref 80–99)
MONOCYTES # BLD: 0.4 K/UL (ref 0–1)
MONOCYTES NFR BLD: 11 % (ref 5–13)
MUCOUS THREADS URNS QL MICRO: ABNORMAL /LPF
NEUTS SEG # BLD: 2.2 K/UL (ref 1.8–8)
NEUTS SEG NFR BLD: 59 % (ref 32–75)
NITRITE UR QL STRIP.AUTO: NEGATIVE
NRBC # BLD: 0 K/UL (ref 0–0.01)
NRBC BLD-RTO: 0 PER 100 WBC
PH UR STRIP: 7 (ref 5–8)
PLATELET # BLD AUTO: 83 K/UL (ref 150–400)
PMV BLD AUTO: 13.3 FL (ref 8.9–12.9)
POTASSIUM SERPL-SCNC: 3.7 MMOL/L (ref 3.5–5.1)
PROT SERPL-MCNC: 5.5 G/DL (ref 6.4–8.2)
PROT UR STRIP-MCNC: 30 MG/DL
RBC # BLD AUTO: 3.63 M/UL (ref 3.8–5.2)
RBC #/AREA URNS HPF: ABNORMAL /HPF (ref 0–5)
SODIUM SERPL-SCNC: 146 MMOL/L (ref 136–145)
SP GR UR REFRACTOMETRY: 1.02 (ref 1–1.03)
TROPONIN I SERPL HS-MCNC: 22 NG/L (ref 0–51)
URINE CULTURE IF INDICATED: ABNORMAL
UROBILINOGEN UR QL STRIP.AUTO: 0.1 EU/DL (ref 0.1–1)
WBC # BLD AUTO: 3.7 K/UL (ref 3.6–11)
WBC URNS QL MICRO: ABNORMAL /HPF (ref 0–4)

## 2024-07-10 PROCEDURE — 86900 BLOOD TYPING SEROLOGIC ABO: CPT

## 2024-07-10 PROCEDURE — 71275 CT ANGIOGRAPHY CHEST: CPT

## 2024-07-10 PROCEDURE — 1100000000 HC RM PRIVATE

## 2024-07-10 PROCEDURE — 94761 N-INVAS EAR/PLS OXIMETRY MLT: CPT

## 2024-07-10 PROCEDURE — 80053 COMPREHEN METABOLIC PANEL: CPT

## 2024-07-10 PROCEDURE — 71045 X-RAY EXAM CHEST 1 VIEW: CPT

## 2024-07-10 PROCEDURE — 85025 COMPLETE CBC W/AUTO DIFF WBC: CPT

## 2024-07-10 PROCEDURE — 86901 BLOOD TYPING SEROLOGIC RH(D): CPT

## 2024-07-10 PROCEDURE — 96374 THER/PROPH/DIAG INJ IV PUSH: CPT

## 2024-07-10 PROCEDURE — 86880 COOMBS TEST DIRECT: CPT

## 2024-07-10 PROCEDURE — 6360000002 HC RX W HCPCS: Performed by: INTERNAL MEDICINE

## 2024-07-10 PROCEDURE — 93005 ELECTROCARDIOGRAM TRACING: CPT | Performed by: NURSE PRACTITIONER

## 2024-07-10 PROCEDURE — 81001 URINALYSIS AUTO W/SCOPE: CPT

## 2024-07-10 PROCEDURE — 84484 ASSAY OF TROPONIN QUANT: CPT

## 2024-07-10 PROCEDURE — 86870 RBC ANTIBODY IDENTIFICATION: CPT

## 2024-07-10 PROCEDURE — 36415 COLL VENOUS BLD VENIPUNCTURE: CPT

## 2024-07-10 PROCEDURE — 99285 EMERGENCY DEPT VISIT HI MDM: CPT

## 2024-07-10 PROCEDURE — 2580000003 HC RX 258: Performed by: INTERNAL MEDICINE

## 2024-07-10 PROCEDURE — 83880 ASSAY OF NATRIURETIC PEPTIDE: CPT

## 2024-07-10 PROCEDURE — 93306 TTE W/DOPPLER COMPLETE: CPT

## 2024-07-10 PROCEDURE — 6360000004 HC RX CONTRAST MEDICATION: Performed by: NURSE PRACTITIONER

## 2024-07-10 PROCEDURE — 86850 RBC ANTIBODY SCREEN: CPT

## 2024-07-10 RX ORDER — FUROSEMIDE 10 MG/ML
40 INJECTION INTRAMUSCULAR; INTRAVENOUS DAILY
Status: DISCONTINUED | OUTPATIENT
Start: 2024-07-10 | End: 2024-07-15 | Stop reason: HOSPADM

## 2024-07-10 RX ORDER — POTASSIUM CHLORIDE 7.45 MG/ML
10 INJECTION INTRAVENOUS PRN
Status: DISCONTINUED | OUTPATIENT
Start: 2024-07-10 | End: 2024-07-15 | Stop reason: HOSPADM

## 2024-07-10 RX ORDER — SODIUM CHLORIDE 9 MG/ML
INJECTION, SOLUTION INTRAVENOUS PRN
Status: DISCONTINUED | OUTPATIENT
Start: 2024-07-10 | End: 2024-07-15 | Stop reason: HOSPADM

## 2024-07-10 RX ORDER — POLYETHYLENE GLYCOL 3350 17 G/17G
17 POWDER, FOR SOLUTION ORAL DAILY PRN
Status: DISCONTINUED | OUTPATIENT
Start: 2024-07-10 | End: 2024-07-15 | Stop reason: HOSPADM

## 2024-07-10 RX ORDER — ACETAMINOPHEN 650 MG/1
650 SUPPOSITORY RECTAL EVERY 6 HOURS PRN
Status: DISCONTINUED | OUTPATIENT
Start: 2024-07-10 | End: 2024-07-15 | Stop reason: HOSPADM

## 2024-07-10 RX ORDER — POTASSIUM CHLORIDE 20 MEQ/1
40 TABLET, EXTENDED RELEASE ORAL PRN
Status: DISCONTINUED | OUTPATIENT
Start: 2024-07-10 | End: 2024-07-15 | Stop reason: HOSPADM

## 2024-07-10 RX ORDER — ONDANSETRON 2 MG/ML
4 INJECTION INTRAMUSCULAR; INTRAVENOUS EVERY 6 HOURS PRN
Status: DISCONTINUED | OUTPATIENT
Start: 2024-07-10 | End: 2024-07-15 | Stop reason: HOSPADM

## 2024-07-10 RX ORDER — ONDANSETRON 4 MG/1
4 TABLET, ORALLY DISINTEGRATING ORAL EVERY 8 HOURS PRN
Status: DISCONTINUED | OUTPATIENT
Start: 2024-07-10 | End: 2024-07-15 | Stop reason: HOSPADM

## 2024-07-10 RX ORDER — SODIUM CHLORIDE 0.9 % (FLUSH) 0.9 %
5-40 SYRINGE (ML) INJECTION PRN
Status: DISCONTINUED | OUTPATIENT
Start: 2024-07-10 | End: 2024-07-15 | Stop reason: HOSPADM

## 2024-07-10 RX ORDER — ACETAMINOPHEN 325 MG/1
650 TABLET ORAL EVERY 6 HOURS PRN
Status: DISCONTINUED | OUTPATIENT
Start: 2024-07-10 | End: 2024-07-15 | Stop reason: HOSPADM

## 2024-07-10 RX ORDER — MAGNESIUM SULFATE IN WATER 40 MG/ML
2000 INJECTION, SOLUTION INTRAVENOUS PRN
Status: DISCONTINUED | OUTPATIENT
Start: 2024-07-10 | End: 2024-07-15 | Stop reason: HOSPADM

## 2024-07-10 RX ORDER — SODIUM CHLORIDE 0.9 % (FLUSH) 0.9 %
5-40 SYRINGE (ML) INJECTION EVERY 12 HOURS SCHEDULED
Status: DISCONTINUED | OUTPATIENT
Start: 2024-07-10 | End: 2024-07-15 | Stop reason: HOSPADM

## 2024-07-10 RX ADMIN — IOPAMIDOL 100 ML: 755 INJECTION, SOLUTION INTRAVENOUS at 17:23

## 2024-07-10 RX ADMIN — FUROSEMIDE 40 MG: 10 INJECTION, SOLUTION INTRAMUSCULAR; INTRAVENOUS at 20:25

## 2024-07-10 RX ADMIN — SODIUM CHLORIDE, PRESERVATIVE FREE 10 ML: 5 INJECTION INTRAVENOUS at 20:25

## 2024-07-10 ASSESSMENT — PAIN DESCRIPTION - LOCATION
LOCATION: BACK
LOCATION: BACK

## 2024-07-10 ASSESSMENT — PAIN SCALES - GENERAL
PAINLEVEL_OUTOF10: 8
PAINLEVEL_OUTOF10: 6

## 2024-07-10 ASSESSMENT — PAIN - FUNCTIONAL ASSESSMENT: PAIN_FUNCTIONAL_ASSESSMENT: 0-10

## 2024-07-10 NOTE — ED PROVIDER NOTES
[facility] EMERGENCY DEPT  EMERGENCY DEPARTMENT HISTORY AND PHYSICAL EXAM      Date: 7/10/2024  Patient Name: Isamar Isidro  MRN: [id number]  YOB: 1945  Date of evaluation: 7/10/2024  Provider: OMA Marks NP   Note Started: 2:27 PM EDT 7/10/24    HISTORY OF PRESENT ILLNESS     Chief Complaint   Patient presents with    Back Pain    Gait Problem       History Provided By: Patient    HPI: Isamar Isidro is a 78 y.o. female The patient presents with back pain that is described as constant, worse than her baseline pain and was told by her oncologist to come to the ER.    - Prior episodes: yes  - Weakness or numbness in upper or lower extremities: yes  - Trauma: denies  - Urine incontinence or retention: denies  - Dysuria or hematuria: denies  - Active malignancy: yes  - IV drug use: denies  - Fever or current infectious symptoms: no  - Immunosuppression: no  - Recent Spinal Procedure: no  - Medications taken for pain prior to arrival: regular prescriptions    Daughter with patient and states that they were told to come to the ER because of her increasing pain and difficulty walking due to her appointment being so far out next week.  Denies any recent falls or known injury.  Patient reports that her lower extremities are more swollen than usual.  She also is short of breath while talking and on exertion but denies any chest pain, abdominal pain, nausea vomiting diarrhea.    No other acute complaints.      PAST MEDICAL HISTORY   Past Medical History:  Past Medical History:   Diagnosis Date    Arthritis     GERD (gastroesophageal reflux disease)     Hypertension     Immunosuppression due to drug therapy (HCC)     Multiple myeloma (HCC)     Thromboembolus (HCC)        Past Surgical History:  Past Surgical History:   Procedure Laterality Date    BONE MARROW TRANSPLANT      GYN      vaginal prolaps       Family History:  Family History   Problem Relation Age of Onset    Hypertension Mother      Results (from the past 12 hour(s))   TYPE AND SCREEN    Collection Time: 07/10/24  2:46 PM   Result Value Ref Range    Crossmatch expiration date 07/13/2024,2359     ABO/Rh O Positive     Antibody Screen PENDING     Antibody Ident PENDING    CBC with Auto Differential    Collection Time: 07/10/24  2:47 PM   Result Value Ref Range    WBC 3.7 3.6 - 11.0 K/uL    RBC 3.63 (L) 3.80 - 5.20 M/uL    Hemoglobin 11.3 (L) 11.5 - 16.0 g/dL    Hematocrit 37.0 35.0 - 47.0 %    .9 (H) 80.0 - 99.0 FL    MCH 31.1 26.0 - 34.0 PG    MCHC 30.5 30.0 - 36.5 g/dL    RDW 15.2 (H) 11.5 - 14.5 %    Platelets 83 (L) 150 - 400 K/uL    MPV 13.3 (H) 8.9 - 12.9 FL    Nucleated RBCs 0.0 0.0  WBC    nRBC 0.00 0.00 - 0.01 K/uL    Neutrophils % 59 32 - 75 %    Lymphocytes % 30 12 - 49 %    Monocytes % 11 5 - 13 %    Eosinophils % 0 0 - 7 %    Basophils % 0 0 - 1 %    Immature Granulocytes % 0 0 - 0.5 %    Neutrophils Absolute 2.2 1.8 - 8.0 K/UL    Lymphocytes Absolute 1.1 0.8 - 3.5 K/UL    Monocytes Absolute 0.4 0.0 - 1.0 K/UL    Eosinophils Absolute 0.0 0.0 - 0.4 K/UL    Basophils Absolute 0.0 0.0 - 0.1 K/UL    Immature Granulocytes Absolute 0.0 0.00 - 0.04 K/UL    Differential Type AUTOMATED     Comprehensive Metabolic Panel    Collection Time: 07/10/24  2:47 PM   Result Value Ref Range    Sodium 146 (H) 136 - 145 mmol/L    Potassium 3.7 3.5 - 5.1 mmol/L    Chloride 107 97 - 108 mmol/L    CO2 36 (H) 21 - 32 mmol/L    Anion Gap 3 (L) 5 - 15 mmol/L    Glucose 155 (H) 65 - 100 mg/dL    BUN 26 (H) 6 - 20 mg/dL    Creatinine 0.74 0.55 - 1.02 mg/dL    BUN/Creatinine Ratio 35 (H) 12 - 20      Est, Glom Filt Rate 82 >60 ml/min/1.73m2    Calcium 9.8 8.5 - 10.1 mg/dL    Total Bilirubin 0.4 0.2 - 1.0 mg/dL    AST 31 15 - 37 U/L    ALT 41 12 - 78 U/L    Alk Phosphatase 166 (H) 45 - 117 U/L    Total Protein 5.5 (L) 6.4 - 8.2 g/dL    Albumin 3.4 (L) 3.5 - 5.0 g/dL    Globulin 2.1 2.0 - 4.0 g/dL    Albumin/Globulin Ratio 1.6 1.1 - 2.2     Brain  patient's being poor historian and daughter is unsure exactly. Work up started and will await her return call.  [KR]   1522 Spoke with patient's oncology office patient had a PET scan done on 20 June at [facility] that showed innumerable widespread axial skeletal lytic lesions and when they spoke with patient today regarding the results she also said that since then she has had difficulty walking and back pain and due to her appointment being next week they wanted her to come in and be seen as well as her sounding short of breath on the phone.  Agreed with the workup ordered thus far.  Discussed whether or not she needed to have the MRIs and given her symptoms is reasonable and can be done today or tomorrow if she is admitted.  They spoke with patient's daughter at bedside over the phone and relayed the results to her and the plan as well.  They will consult on patient if she is admitted which is likely. [KR]   1607 CBC with hemoglobin above patient's baseline with chronic anemia, platelets improved from last available values, no other significant finding on CBC.   no priors for comparison, troponin 22 and improved from priors.  Urinalysis pending.  CMP with sodium 146, CO2 36, BUN 26 but no other significant renal finding and hepatic function is at or above patient's baseline.  Will consult with hospitalist regarding patient's complaint, concerns from oncology and findings thus far for disposition of discharge versus admission. [KR]   1611 Discussed chest x-ray findings with daughter who reports that the rib fractures seen on the left side are not new they are old and patient has had no recent falls [KR]   1627 Discussed patient with hospitalist who agreed patient's work up does not reveal cause of her shortness of breath and given her history and risk factors warrants ruling out PE.  [KR]   1631 Patient continues to be tachypneic at rest and with talking. Respirations 28-32 with room air

## 2024-07-10 NOTE — H&P
History & Physical    Primary Care Provider: Leandro Rodríguez MD  Source of Information: Patient/family     Chief complaint:   Chief Complaint   Patient presents with    Back Pain    Gait Problem        History of Presenting Illness:   Isamar Isidro is a 78 y.o. female with a past medical history as noted below.    Patient had an autologous treatment for mutiple myleoma in 2011. She has been receiving treatment with daratumumab monotherapy recently. Her oncologist is Dr. Camacho.  The patient's daughter states that there was a tentative plan to change the patient's medication because her multiple myeloma has been worsening.  The patient has been reluctant to do this.    Over the past several weeks the patient has been feeling tired and has had new/worsening lower extremity edema.  The patient called her oncologist about this and was told to come to the emergency department for further evaluation.    Workup in the ED revealed:      98.4 °F, respiratory rate 20 pulse 83 blood pressure 165/80 SpO2 94% on room air    Urinalysis bland  WBC 3.7  Hemoglobin 11.3  Platelets 83  Sodium 146  Bicarb 36  BUN/creatinine 26/0.74    High-sensitivity troponin 22    CTA chest  1. No evidence of pulmonary embolism.  2. Small bilateral pleural effusions with mild bibasilar atelectasis.  3. Focal airspace disease in the lingula may represent pneumonia.  4. Cardiomegaly.  5. Evidence of chronic pulmonary arterial hypertension.  6. Chronic compression deformities of many vertebral bodies, in addition to  lytic lesions throughout the spine, nonspecific.    EKG pending     Review of Systems:  A comprehensive review of systems was negative except for that written in the History of Present Illness.     Past Medical History:   Diagnosis Date    Arthritis     GERD (gastroesophageal reflux disease)     Hypertension     Immunosuppression due to drug therapy (HCC)     Multiple myeloma (HCC)     Thromboembolus (HCC)         Past  discharged from the emergency department and that patient warrants admission to hospital.      Assessment/Plan:   Isamar Isidro is a 78 y.o. female with a past medical history as noted below.    Patient had an autologous treatment for mutiple myleoma in 2011. She has been receiving treatment with daratumumab monotherapy recently. Her oncologist is Dr. Camacho.  The patient's daughter states that there was a tentative plan to change the patient's medication because her multiple myeloma has been worsening.  The patient has been reluctant to do this.    Over the past several weeks the patient has been feeling tired and has had new/worsening lower extremity edema.  The patient called her oncologist about this and was told to come to the emergency department for further evaluation.    Workup in the ED revealed:      98.4 °F, respiratory rate 20 pulse 83 blood pressure 165/80 SpO2 94% on room air    Urinalysis bland  WBC 3.7  Hemoglobin 11.3  Platelets 83  Sodium 146  Bicarb 36  BUN/creatinine 26/0.74    High-sensitivity troponin 22    CTA chest  1. No evidence of pulmonary embolism.  2. Small bilateral pleural effusions with mild bibasilar atelectasis.  3. Focal airspace disease in the lingula may represent pneumonia.  4. Cardiomegaly.  5. Evidence of chronic pulmonary arterial hypertension.  6. Chronic compression deformities of many vertebral bodies, in addition to  lytic lesions throughout the spine, nonspecific.    EKG pending    NEURO/PSYCH  No acute/chronic issues.     CARDIOVASCULAR  SUSPECTED NEW ONSET CHF  Need to consider cardiac amyloid deposition as an etiology given her worsening MM. Will need to ask Oncologist if daratumumab is known to cause cardiac complications. ECHO pending. Start lasix 40 mg iv qd. Daily weights. Strict I/O. Low salt diet. Can hold on Cardiology consult until ECHO returns.     HTN  Continue coreg. Hold amlodipine and possibly substitute it if the etiology of lower

## 2024-07-10 NOTE — ED TRIAGE NOTES
C/o difficulty ambulating due to lower back pain that radiates down to both hips. Denies falls or injuries. Contacted Dr Camacho, PCP, and was told to come here, that it was too long to wait until appointment

## 2024-07-10 NOTE — ED NOTES
Patient came in c/o back pain and difficult walking. Patient is also SOB-nasal canula placed at 2 liters oxygen, sat 98-99%. Patient will be admitted for further evaluation and treatment. JULIANA PEÑA

## 2024-07-11 ENCOUNTER — APPOINTMENT (OUTPATIENT)
Facility: HOSPITAL | Age: 79
End: 2024-07-11
Payer: MEDICARE

## 2024-07-11 LAB
ABO + RH BLD: NORMAL
ALBUMIN SERPL-MCNC: 3.1 G/DL (ref 3.5–5)
ALBUMIN/GLOB SERPL: 1.3 (ref 1.1–2.2)
ALP SERPL-CCNC: 148 U/L (ref 45–117)
ALT SERPL-CCNC: 42 U/L (ref 12–78)
ANION GAP SERPL CALC-SCNC: 1 MMOL/L (ref 5–15)
AST SERPL W P-5'-P-CCNC: 35 U/L (ref 15–37)
BASOPHILS # BLD: 0 K/UL (ref 0–0.1)
BASOPHILS NFR BLD: 0 % (ref 0–1)
BILIRUB SERPL-MCNC: 0.4 MG/DL (ref 0.2–1)
BLOOD BANK CMNT PATIENT-IMP: NORMAL
BLOOD GROUP ANTIBODIES SERPL: NORMAL
BLOOD GROUP ANTIBODIES SERPL: POSITIVE
BUN SERPL-MCNC: 20 MG/DL (ref 6–20)
BUN/CREAT SERPL: 33 (ref 12–20)
CA-I BLD-MCNC: 9.6 MG/DL (ref 8.5–10.1)
CHLORIDE SERPL-SCNC: 105 MMOL/L (ref 97–108)
CO2 SERPL-SCNC: 40 MMOL/L (ref 21–32)
CREAT SERPL-MCNC: 0.61 MG/DL (ref 0.55–1.02)
DIFFERENTIAL METHOD BLD: ABNORMAL
ECHO AO ROOT DIAM: 3 CM
ECHO AO ROOT INDEX: 2.13 CM/M2
ECHO AV AREA PEAK VELOCITY: 1.4 CM2
ECHO AV AREA/BSA PEAK VELOCITY: 1 CM2/M2
ECHO AV PEAK GRADIENT: 5 MMHG
ECHO AV PEAK VELOCITY: 1.1 M/S
ECHO AV VELOCITY RATIO: 0.64
ECHO BSA: 1.37 M2
ECHO LA AREA 4C: 16.6 CM2
ECHO LA DIAMETER INDEX: 2.55 CM/M2
ECHO LA DIAMETER: 3.6 CM
ECHO LA MAJOR AXIS: 5.5 CM
ECHO LA TO AORTIC ROOT RATIO: 1.2
ECHO LA VOL MOD A4C: 39 ML (ref 22–52)
ECHO LA VOLUME INDEX MOD A4C: 28 ML/M2 (ref 16–34)
ECHO LV E' LATERAL VELOCITY: 10 CM/S
ECHO LV E' SEPTAL VELOCITY: 5 CM/S
ECHO LV FRACTIONAL SHORTENING: 45 % (ref 28–44)
ECHO LV INTERNAL DIMENSION DIASTOLE INDEX: 2.7 CM/M2
ECHO LV INTERNAL DIMENSION DIASTOLIC: 3.8 CM (ref 3.9–5.3)
ECHO LV INTERNAL DIMENSION SYSTOLIC INDEX: 1.49 CM/M2
ECHO LV INTERNAL DIMENSION SYSTOLIC: 2.1 CM
ECHO LV IVSD: 1.2 CM (ref 0.6–0.9)
ECHO LV MASS 2D: 143.8 G (ref 67–162)
ECHO LV MASS INDEX 2D: 102 G/M2 (ref 43–95)
ECHO LV POSTERIOR WALL DIASTOLIC: 1.1 CM (ref 0.6–0.9)
ECHO LV RELATIVE WALL THICKNESS RATIO: 0.58
ECHO LVOT AREA: 2.3 CM2
ECHO LVOT DIAM: 1.7 CM
ECHO LVOT PEAK GRADIENT: 2 MMHG
ECHO LVOT PEAK VELOCITY: 0.7 M/S
ECHO MV A VELOCITY: 0.77 M/S
ECHO MV E DECELERATION TIME (DT): 224 MS
ECHO MV E VELOCITY: 0.69 M/S
ECHO MV E/A RATIO: 0.9
ECHO MV E/E' LATERAL: 6.9
ECHO MV E/E' RATIO (AVERAGED): 10.35
ECHO MV E/E' SEPTAL: 13.8
ECHO MV REGURGITANT PEAK GRADIENT: 96 MMHG
ECHO MV REGURGITANT PEAK VELOCITY: 4.9 M/S
ECHO PULMONARY ARTERY END DIASTOLIC PRESSURE: 3 MMHG
ECHO PV ACCELERATION TIME (AT): 98 MS
ECHO PV MAX VELOCITY: 1 M/S
ECHO PV PEAK GRADIENT: 4 MMHG
ECHO PV REGURGITANT MAX VELOCITY: 0.9 M/S
ECHO RA AREA 4C: 20.5 CM2
ECHO RA END SYSTOLIC VOLUME APICAL 4 CHAMBER INDEX BSA: 43 ML/M2
ECHO RA VOLUME: 60 ML
ECHO RV BASAL DIMENSION: 3.1 CM
ECHO TV REGURGITANT MAX VELOCITY: 2.67 M/S
ECHO TV REGURGITANT PEAK GRADIENT: 29 MMHG
EOSINOPHIL # BLD: 0 K/UL (ref 0–0.4)
EOSINOPHIL NFR BLD: 0 % (ref 0–7)
ERYTHROCYTE [DISTWIDTH] IN BLOOD BY AUTOMATED COUNT: 15.3 % (ref 11.5–14.5)
GLOBULIN SER CALC-MCNC: 2.4 G/DL (ref 2–4)
GLUCOSE SERPL-MCNC: 88 MG/DL (ref 65–100)
HCT VFR BLD AUTO: 34.7 % (ref 35–47)
HGB BLD-MCNC: 10.6 G/DL (ref 11.5–16)
IMM GRANULOCYTES # BLD AUTO: 0 K/UL (ref 0–0.04)
IMM GRANULOCYTES NFR BLD AUTO: 0 % (ref 0–0.5)
LYMPHOCYTES # BLD: 1.1 K/UL (ref 0.8–3.5)
LYMPHOCYTES NFR BLD: 37 % (ref 12–49)
MAGNESIUM SERPL-MCNC: 1.9 MG/DL (ref 1.6–2.4)
MCH RBC QN AUTO: 31.3 PG (ref 26–34)
MCHC RBC AUTO-ENTMCNC: 30.5 G/DL (ref 30–36.5)
MCV RBC AUTO: 102.4 FL (ref 80–99)
MONOCYTES # BLD: 0.5 K/UL (ref 0–1)
MONOCYTES NFR BLD: 15 % (ref 5–13)
NEUTS SEG # BLD: 1.5 K/UL (ref 1.8–8)
NEUTS SEG NFR BLD: 48 % (ref 32–75)
NRBC # BLD: 0 K/UL (ref 0–0.01)
NRBC BLD-RTO: 0 PER 100 WBC
PLATELET # BLD AUTO: 84 K/UL (ref 150–400)
POTASSIUM SERPL-SCNC: 3.2 MMOL/L (ref 3.5–5.1)
PROT SERPL-MCNC: 5.5 G/DL (ref 6.4–8.2)
RBC # BLD AUTO: 3.39 M/UL (ref 3.8–5.2)
RBC MORPH BLD: ABNORMAL
RBC MORPH BLD: ABNORMAL
SODIUM SERPL-SCNC: 146 MMOL/L (ref 136–145)
SPECIMEN EXP DATE BLD: NORMAL
WBC # BLD AUTO: 3.1 K/UL (ref 3.6–11)

## 2024-07-11 PROCEDURE — 72146 MRI CHEST SPINE W/O DYE: CPT

## 2024-07-11 PROCEDURE — 2700000000 HC OXYGEN THERAPY PER DAY

## 2024-07-11 PROCEDURE — 6370000000 HC RX 637 (ALT 250 FOR IP): Performed by: INTERNAL MEDICINE

## 2024-07-11 PROCEDURE — 83735 ASSAY OF MAGNESIUM: CPT

## 2024-07-11 PROCEDURE — 36415 COLL VENOUS BLD VENIPUNCTURE: CPT

## 2024-07-11 PROCEDURE — 6360000002 HC RX W HCPCS: Performed by: INTERNAL MEDICINE

## 2024-07-11 PROCEDURE — 85025 COMPLETE CBC W/AUTO DIFF WBC: CPT

## 2024-07-11 PROCEDURE — 2580000003 HC RX 258: Performed by: INTERNAL MEDICINE

## 2024-07-11 PROCEDURE — 94761 N-INVAS EAR/PLS OXIMETRY MLT: CPT

## 2024-07-11 PROCEDURE — 1100000000 HC RM PRIVATE

## 2024-07-11 PROCEDURE — 72148 MRI LUMBAR SPINE W/O DYE: CPT

## 2024-07-11 PROCEDURE — 80053 COMPREHEN METABOLIC PANEL: CPT

## 2024-07-11 RX ORDER — HYDROCODONE BITARTRATE AND ACETAMINOPHEN 5; 325 MG/1; MG/1
1 TABLET ORAL EVERY 6 HOURS PRN
Status: DISCONTINUED | OUTPATIENT
Start: 2024-07-11 | End: 2024-07-15 | Stop reason: HOSPADM

## 2024-07-11 RX ORDER — ALLOPURINOL 100 MG/1
300 TABLET ORAL DAILY
Status: DISCONTINUED | OUTPATIENT
Start: 2024-07-11 | End: 2024-07-15 | Stop reason: HOSPADM

## 2024-07-11 RX ORDER — CARVEDILOL 12.5 MG/1
12.5 TABLET ORAL 2 TIMES DAILY
Status: DISCONTINUED | OUTPATIENT
Start: 2024-07-11 | End: 2024-07-15 | Stop reason: HOSPADM

## 2024-07-11 RX ADMIN — CARVEDILOL 12.5 MG: 12.5 TABLET, FILM COATED ORAL at 21:26

## 2024-07-11 RX ADMIN — RIVAROXABAN 10 MG: 10 TABLET, FILM COATED ORAL at 09:41

## 2024-07-11 RX ADMIN — CARVEDILOL 12.5 MG: 12.5 TABLET, FILM COATED ORAL at 09:41

## 2024-07-11 RX ADMIN — ACETAMINOPHEN 650 MG: 325 TABLET ORAL at 17:51

## 2024-07-11 RX ADMIN — FUROSEMIDE 40 MG: 10 INJECTION, SOLUTION INTRAMUSCULAR; INTRAVENOUS at 09:41

## 2024-07-11 RX ADMIN — SODIUM CHLORIDE, PRESERVATIVE FREE 10 ML: 5 INJECTION INTRAVENOUS at 21:26

## 2024-07-11 RX ADMIN — SODIUM CHLORIDE, PRESERVATIVE FREE 10 ML: 5 INJECTION INTRAVENOUS at 09:41

## 2024-07-11 RX ADMIN — ALLOPURINOL 300 MG: 100 TABLET ORAL at 09:41

## 2024-07-11 ASSESSMENT — PAIN SCALES - GENERAL
PAINLEVEL_OUTOF10: 0
PAINLEVEL_OUTOF10: 0
PAINLEVEL_OUTOF10: 8

## 2024-07-11 ASSESSMENT — PAIN - FUNCTIONAL ASSESSMENT: PAIN_FUNCTIONAL_ASSESSMENT: ACTIVITIES ARE NOT PREVENTED

## 2024-07-11 ASSESSMENT — PAIN DESCRIPTION - LOCATION: LOCATION: BACK

## 2024-07-11 ASSESSMENT — PAIN DESCRIPTION - DESCRIPTORS: DESCRIPTORS: ACHING

## 2024-07-11 ASSESSMENT — PAIN DESCRIPTION - ORIENTATION: ORIENTATION: DISTAL

## 2024-07-11 NOTE — CONSULTS
Consult    NAME: Isamar Isidro   :  1945   MRN:  [id number]     Date/Time:  2024 1:06 PM    Patient PCP: Leandro Rodríguez MD  ________________________________________________________________________      Subjective:   CHIEF COMPLAINT: Chart reviewed.  Patient presented for feeling fatigued and tired and leg edema.    HISTORY OF PRESENT ILLNESS:   Patient has multiple myeloma since  and has been receiving therapy however probably there is going to be plan of change of medication as per oncologist.  She stated that she had a pain in the wrist and the shoulder and has been noticing leg edema for couple months.  Her BNP is a somewhat elevated to 854 and a cardiology consultation is invited for evaluation for possible heart failure.           Past Medical History:   Diagnosis Date    Arthritis     GERD (gastroesophageal reflux disease)     Hypertension     Immunosuppression due to drug therapy (HCC)     Multiple myeloma (HCC)     Thromboembolus (HCC)       Past Surgical History:   Procedure Laterality Date    BONE MARROW TRANSPLANT      GYN      vaginal prolaps     Allergies   Allergen Reactions    Decamethonium Other (See Comments)     She thinks it made her bleed      Meds:  See below  Social History     Tobacco Use    Smoking status: Former    Smokeless tobacco: Never    Tobacco comments:     Quit smoking: quit 50 years ago   Substance Use Topics    Alcohol use: Never   Patient stated that more than 30 years ago she did smoke occasionally currently 1 to 2 cigarettes a day and no history of drinking or drugs.    Family History   Problem Relation Age of Onset    Hypertension Mother     Hypertension Father         FAMILY HISTORY: Her mother  in her 60s and father also  in his 60s but she could not tell me because of demise.    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    REVIEW OF SYSTEMS:     []         Unable to

## 2024-07-11 NOTE — CONSULTS
Hematology/Oncology Consult    Patient: Isamar Isidro MRN: [id number]     YOB: 1945  Age: 78 y.o.  Sex: female      HPI      Isamar Isidro is a 78 y.o. female who is being seen for multiple myeloma.    Patient presented to the emergency room complaining of difficulty ambulating due to lower back pain radiating down both hips. Patient had autologous us treatment for multiple myleoma in 2011, currently receiving daratumumab monotherapy by Dr. Camacho at [facility]. S/p  Revlimid. S/p Pomalidomide Pt on decadron 20 mg once a week and Velcade.     Disease Features  1. IgG-kappa Multiple myeloma:  The last light chain ratio wasnormal and the IgG was lower.   2. Leukopenia: Likely secondary to Revlimid.  3. Thrombocytopenia: No bleeding. Continue to monitor..     Treatment History  1. autologous stem cell transplatation in 2011  maintenance Revlimid 5 mg  Zometa, d/c due to progression  Pomalyst d/c due to DVT.     Current Treatment  Xarelto  Velcade shots, began at 11/15/21.     Past Medical History:   Diagnosis Date    Arthritis     GERD (gastroesophageal reflux disease)     Hypertension     Immunosuppression due to drug therapy (HCC)     Multiple myeloma (HCC)     Thromboembolus (HCC)      Past Surgical History:   Procedure Laterality Date    BONE MARROW TRANSPLANT      GYN      vaginal prolaps      Family History   Problem Relation Age of Onset    Hypertension Mother     Hypertension Father      Social History     Tobacco Use    Smoking status: Former    Smokeless tobacco: Never    Tobacco comments:     Quit smoking: quit 50 years ago   Substance Use Topics    Alcohol use: Never      Current Facility-Administered Medications   Medication Dose Route Frequency Provider Last Rate Last Admin    allopurinol (ZYLOPRIM) tablet 300 mg  300 mg Oral Daily Seipp, James, DO   300 mg at 07/11/24 0941    carvedilol (COREG) tablet 12.5 mg  12.5 mg Oral BID Seipp, James, DO   12.5 mg at 07/11/24 0941     100 WBC    nRBC 0.00 0.00 - 0.01 K/uL    Neutrophils % 48 32 - 75 %    Lymphocytes % 37 12 - 49 %    Monocytes % 15 (H) 5 - 13 %    Eosinophils % 0 0 - 7 %    Basophils % 0 0 - 1 %    Immature Granulocytes % 0 0 - 0.5 %    Neutrophils Absolute 1.5 (L) 1.8 - 8.0 K/UL    Lymphocytes Absolute 1.1 0.8 - 3.5 K/UL    Monocytes Absolute 0.5 0.0 - 1.0 K/UL    Eosinophils Absolute 0.0 0.0 - 0.4 K/UL    Basophils Absolute 0.0 0.0 - 0.1 K/UL    Immature Granulocytes Absolute 0.0 0.00 - 0.04 K/UL    Differential Type Smear Scanned      RBC Comment Anisocytosis  1+        RBC Comment Macrocytosis  1+       Comprehensive Metabolic Panel    Collection Time: 07/11/24  6:06 AM   Result Value Ref Range    Sodium 146 (H) 136 - 145 mmol/L    Potassium 3.2 (L) 3.5 - 5.1 mmol/L    Chloride 105 97 - 108 mmol/L    CO2 40 (H) 21 - 32 mmol/L    Anion Gap 1 (L) 5 - 15 mmol/L    Glucose 88 65 - 100 mg/dL    BUN 20 6 - 20 mg/dL    Creatinine 0.61 0.55 - 1.02 mg/dL    BUN/Creatinine Ratio 33 (H) 12 - 20      Est, Glom Filt Rate >90 >60 ml/min/1.73m2    Calcium 9.6 8.5 - 10.1 mg/dL    Total Bilirubin 0.4 0.2 - 1.0 mg/dL    AST 35 15 - 37 U/L    ALT 42 12 - 78 U/L    Alk Phosphatase 148 (H) 45 - 117 U/L    Total Protein 5.5 (L) 6.4 - 8.2 g/dL    Albumin 3.1 (L) 3.5 - 5.0 g/dL    Globulin 2.4 2.0 - 4.0 g/dL    Albumin/Globulin Ratio 1.3 1.1 - 2.2     Magnesium    Collection Time: 07/11/24  6:06 AM   Result Value Ref Range    Magnesium 1.9 1.6 - 2.4 mg/dL      CTA CHEST W WO CONTRAST PE Eval 7/10/24  IMPRESSION:  1. No evidence of pulmonary embolism.  2. Small bilateral pleural effusions with mild bibasilar atelectasis.  3. Focal airspace disease in the lingula may represent pneumonia.  4. Cardiomegaly.  5. Evidence of chronic pulmonary arterial hypertension.  6. Chronic compression deformities of many vertebral bodies, in addition to  lytic lesions throughout the spine, nonspecific.    Assessment and Plan:     IgG-kappa Multiple myeloma  -Had  autologous treatment in 2011  -Currently on daratumumab monotherapy with Dr. Camacho; Last administered June 19    Thrombocytopenia  Anemia   -related to MM  -has had thrombocytopenia since 2016  -Continue to monitor counts  -Transfuse platelets for platelet count < 10,000 or < 20,000 with any evidence of bleeding  -Transfuse for hemoglobin < 7 g/dL    Thank you for the consult.  Next appointment with Dr. Camacho is scheduled for July 17, advised patient to keep this follow-up.    Signed By: OMA Jasso - CNP     July 11, 2024

## 2024-07-11 NOTE — PROGRESS NOTES
Comprehensive Nutrition Assessment    Type and Reason for Visit:  Positive Nutrition Screen, Initial (BMI)    Nutrition Recommendations/Plan:   Continue Current Diet  Encourage adequate PO intake  Initiate ONS 3x/day   Obtain bed scale weight and update chart  Monitor PO intake, ONS intake/tolerance and BM in I/O's     Malnutrition Assessment:  Malnutrition Status:  Moderate malnutrition (07/11/24 1116)    Context:  Acute Illness     Findings of the 6 clinical characteristics of malnutrition:  Energy Intake:  Mild decrease in energy intake (Comment) (decreased appetite)  Weight Loss:  Greater than 7.5% over 3 months (Per Pt, 40 lbs wt loss in 3 months)     Body Fat Loss:  Mild body fat loss Buccal region   Muscle Mass Loss:  Mild muscle mass loss Temples (temporalis)  Fluid Accumulation:  Unable to assess     Strength:  Not Performed    Nutrition Assessment:    RD assessed for low BMI w MST 5. RD spoke w pt at bedside who reports having a \"very little\" appetite w ~ 50% PO intake from breakfast. Dicussed initiating ONS 3x/day to address wt loss and decreased intake, Pt agreeable. Med reviewed. Abnormal labs include Na 146, K 3.2, Albumin 3.1, Hgb 10.6, Hct 34.7.    Nutrition Related Findings:    NFPE deferred to f/u, observed as malnourished. Pt reports feeling nauseous at time of visit but no vomiting. Denies dysphagia and any bowel discomfort. Edema RLE, LLE +2. LBM 7/11. Wound Type: Skin Tears       Current Nutrition Intake & Therapies:    Average Meal Intake: 26-50%  Average Supplements Intake: None Ordered  ADULT DIET; Regular; Low Sodium (2 gm)    Anthropometric Measures:  Height: 165.1 cm (5' 5\")  Ideal Body Weight (IBW): 125 lbs (57 kg)       Current Body Weight: 40.8 kg (89 lb 15.2 oz),   IBW. Weight Source: Stated  Current BMI (kg/m2): 15  Usual Body Weight: 59 kg (130 lb)  % Weight Change (Calculated): -30.8                    BMI Categories: Underweight (BMI less than 22) age over 65    Estimated

## 2024-07-11 NOTE — PLAN OF CARE
Problem: Skin/Tissue Integrity  Goal: Absence of new skin breakdown  Description: 1.  Monitor for areas of redness and/or skin breakdown  2.  Assess vascular access sites hourly  3.  Every 4-6 hours minimum:  Change oxygen saturation probe site  4.  Every 4-6 hours:  If on nasal continuous positive airway pressure, respiratory therapy assess nares and determine need for appliance change or resting period.  7/10/2024 2257 by Doreen Velarde LPN  Outcome: Progressing     Problem: Safety - Adult  Goal: Free from fall injury  7/11/2024 0947 by Loli Mckeon RN  Outcome: Progressing  7/10/2024 2257 by Doreen Velarde LPN  Outcome: Progressing     Problem: ABCDS Injury Assessment  Goal: Absence of physical injury  7/10/2024 2257 by Doreen Velarde LPN  Outcome: Progressing      Caller would like to discuss an/a Appointment for ingrown toe nail. Writer advised caller of callback within 24-72 hours.    Patient Name: Sapphire R Peterson  Caller Name: same   Name of Facility:   Fax Number:   Callback Number: [phone] (H    Additional Info:   She has a New patient appointment set up for 03/15/17 for an ingrown toe nail. She states that its her left big toe and its really red and infected. She is wondering if Dr. Sanches can see her anytime this week. She states that it is very painful and she is not sure if she can wait until next week to be seen. She would like a call back letting her know either way.       Thank you,  Michelle Rogers

## 2024-07-11 NOTE — CARE COORDINATION
07/11/24 1429   Service Assessment   Patient Orientation Alert and Oriented   Cognition Alert   History Provided By Patient   Primary Caregiver Self   Support Systems Children   Patient's Healthcare Decision Maker is: Legal Next of Kin   PCP Verified by CM Yes   Last Visit to PCP Within last 3 months   Prior Functional Level Independent in ADLs/IADLs   Current Functional Level Independent in ADLs/IADLs   Can patient return to prior living arrangement Yes   Ability to make needs known: Good   Family able to assist with home care needs: Yes   Would you like for me to discuss the discharge plan with any other family members/significant others, and if so, who? Yes     Advance Care Planning     General Advance Care Planning (ACP) Conversation    Date of Conversation: 7/11/2024  Conducted with: Patient with Decision Making Capacity  Other persons present: None    Healthcare Decision Maker:   Primary Decision Maker: Christal Isidro - Child - [phone]  Click here to complete Healthcare Decision Makers including selection of the Healthcare Decision Maker Relationship (ie \"Primary\").       Length of Voluntary ACP Conversation in minutes:  <16 minutes (Non-Billable)    Edmund Hou RN CM met with patient at bedside to discuss discharge planning assessment. Patient lives alone at address on file.  She uses a walker and a cane at home.  CM asked about ADLs, patient states she does the best she can at home. She does have family support.  CM inquired about home health, patient reports having home health over a year ago and is open to having home health again.  [place] of choice given for no preference.  CM sent  referrals. Daughter will  at time of discharge.

## 2024-07-11 NOTE — PROGRESS NOTES
4 Eyes Skin Assessment     NAME:  Isamar Isidro  YOB: 1945  MEDICAL RECORD NUMBER:  [id number]    The patient is being assessed for  Admission    I agree that at least one RN has performed a thorough Head to Toe Skin Assessment on the patient. ALL assessment sites listed below have been assessed.      Areas assessed by both nurses:    Head, Face, Ears, Shoulders, Back, Chest, Arms, Elbows, Hands, Sacrum. Buttock, Coccyx, Ischium, Legs. Feet and Heels, and Under Medical Devices         Does the Patient have a Wound? Yes wound(s) were present on assessment. LDA wound assessment was Initiated and completed by RN    Skin tear to sacrum        Matteo Prevention initiated by RN: Yes  Wound Care Orders initiated by RN: Yes    Pressure Injury (Stage 3,4, Unstageable, DTI, NWPT, and Complex wounds) if present, place Wound referral order by RN under : No    New Ostomies, if present place, Ostomy referral order under : No     Nurse 1 eSignature: Electronically signed by Doreen Velarde LPN on 7/10/24 at 11:01 PM EDT    **SHARE this note so that the co-signing nurse can place an eSignature**    Nurse 2 eSignature: Electronically signed by Abimael Hernandez RN on 7/10/24 at 11:38 PM EDT

## 2024-07-11 NOTE — WOUND CARE
Wound Care Note:      Wound Care into see patient because of skin tear to sacrum    Patient resting in bed, states that she is on the bedpan and ready to come off. WCN removed bed and cleansed area. Patient states that sitting on the bedpan really hurts her back. Patient was positioned on her left side with pillow between knees after assessment.     Sacrum intact with hypopigmented scar tissue present from possible previous pressure injury. No erythema or wound noted at this time. Recommend applying sacral foam dressing for protection. Dressing to be changed every other day and nurse to peel back daily for skin assessment.       Recommend applying sacral foam dressing to sacrum for protection. Dressing to be changed every other day and PRN for soiling, nurse to peel back dressing daily for skin assessment.     Float heels on pillow at all times while in bed, place pillow long ways under patient leg so knee is supported and heel is hanging off edge of pillow.    Boot are to be applied daily and kept on at all times while in bed for offloading and to prevent pressure injuries.      Use foam body alignment wedge to turn patient q2h at 30 degree angle to offload sacrum to prevent pressure related skin injury.  Do not position directly on greater trochanter.             Please re-consult WCN for any changes in skin condition.

## 2024-07-11 NOTE — ED NOTES
ED TO INPATIENT SBAR HANDOFF    Patient Name: Isamar Isidro   Preferred Name: Isamar  : 1945  78 y.o.   Family/Caregiver Present: no   Code Status Order: DNR  PO Status: NPO:No  Telemetry Order:   C-SSRS: Risk of Suicide: No Risk  Sitter no  No  Restraints:     Sepsis Risk Score      Situation  Chief Complaint   Patient presents with    Back Pain    Gait Problem     Brief Description of Patient's Condition: Patient presented for SOB and lower back pain for several days. Patient was found to have pleural effusions and pitting bilateral edema. Echo was performed in ED approximately 20mins ago, but not yet resulted. Patient utilizing purewick at this time, has not ambulated since arrival, but alert and oriented.   Mental Status: oriented, alert, coherent, logical, thought processes intact, and able to concentrate and follow conversation  Arrived from:Home  Imaging:   CTA CHEST W WO CONTRAST PE Eval   Final Result      1. No evidence of pulmonary embolism.   2. Small bilateral pleural effusions with mild bibasilar atelectasis.   3. Focal airspace disease in the lingula may represent pneumonia.   4. Cardiomegaly.   5. Evidence of chronic pulmonary arterial hypertension.   6. Chronic compression deformities of many vertebral bodies, in addition to   lytic lesions throughout the spine, nonspecific.         Electronically signed by Josh Field MD      XR CHEST PORTABLE   Final Result      Possible small left pleural effusion. Age-indeterminate nondisplaced left rib   fractures. Clear lungs.         Electronically signed by Dane Vieira        Abnormal labs:   Abnormal Labs Reviewed   CBC WITH AUTO DIFFERENTIAL - Abnormal; Notable for the following components:       Result Value    RBC 3.63 (*)     Hemoglobin 11.3 (*)     .9 (*)     RDW 15.2 (*)     Platelets 83 (*)     MPV 13.3 (*)     All other components within normal limits   COMPREHENSIVE METABOLIC PANEL - Abnormal; Notable for the following

## 2024-07-12 LAB
ALBUMIN SERPL-MCNC: 2.8 G/DL (ref 3.5–5)
ALBUMIN/GLOB SERPL: 1.3 (ref 1.1–2.2)
ALP SERPL-CCNC: 137 U/L (ref 45–117)
ALT SERPL-CCNC: 37 U/L (ref 12–78)
ANION GAP SERPL CALC-SCNC: 3 MMOL/L (ref 5–15)
AST SERPL W P-5'-P-CCNC: 31 U/L (ref 15–37)
BASOPHILS # BLD: 0 K/UL (ref 0–0.1)
BASOPHILS NFR BLD: 0 % (ref 0–1)
BILIRUB SERPL-MCNC: 0.5 MG/DL (ref 0.2–1)
BUN SERPL-MCNC: 21 MG/DL (ref 6–20)
BUN/CREAT SERPL: 39 (ref 12–20)
CA-I BLD-MCNC: 9.7 MG/DL (ref 8.5–10.1)
CHLORIDE SERPL-SCNC: 101 MMOL/L (ref 97–108)
CO2 SERPL-SCNC: 38 MMOL/L (ref 21–32)
CREAT SERPL-MCNC: 0.54 MG/DL (ref 0.55–1.02)
DIFFERENTIAL METHOD BLD: ABNORMAL
EOSINOPHIL # BLD: 0 K/UL (ref 0–0.4)
EOSINOPHIL NFR BLD: 0 % (ref 0–7)
ERYTHROCYTE [DISTWIDTH] IN BLOOD BY AUTOMATED COUNT: 14.9 % (ref 11.5–14.5)
GLOBULIN SER CALC-MCNC: 2.1 G/DL (ref 2–4)
GLUCOSE SERPL-MCNC: 93 MG/DL (ref 65–100)
HCT VFR BLD AUTO: 34.5 % (ref 35–47)
HGB BLD-MCNC: 10.7 G/DL (ref 11.5–16)
IMM GRANULOCYTES # BLD AUTO: 0 K/UL (ref 0–0.04)
IMM GRANULOCYTES NFR BLD AUTO: 0 % (ref 0–0.5)
LYMPHOCYTES # BLD: 1.3 K/UL (ref 0.8–3.5)
LYMPHOCYTES NFR BLD: 36 % (ref 12–49)
MAGNESIUM SERPL-MCNC: 1.8 MG/DL (ref 1.6–2.4)
MCH RBC QN AUTO: 31.7 PG (ref 26–34)
MCHC RBC AUTO-ENTMCNC: 31 G/DL (ref 30–36.5)
MCV RBC AUTO: 102.1 FL (ref 80–99)
MONOCYTES # BLD: 0.5 K/UL (ref 0–1)
MONOCYTES NFR BLD: 13 % (ref 5–13)
NEUTS SEG # BLD: 1.8 K/UL (ref 1.8–8)
NEUTS SEG NFR BLD: 51 % (ref 32–75)
NRBC # BLD: 0 K/UL (ref 0–0.01)
NRBC BLD-RTO: 0 PER 100 WBC
PLATELET # BLD AUTO: 71 K/UL (ref 150–400)
POTASSIUM SERPL-SCNC: 3.3 MMOL/L (ref 3.5–5.1)
PROT SERPL-MCNC: 4.9 G/DL (ref 6.4–8.2)
RBC # BLD AUTO: 3.38 M/UL (ref 3.8–5.2)
SODIUM SERPL-SCNC: 142 MMOL/L (ref 136–145)
WBC # BLD AUTO: 3.6 K/UL (ref 3.6–11)

## 2024-07-12 PROCEDURE — 94761 N-INVAS EAR/PLS OXIMETRY MLT: CPT

## 2024-07-12 PROCEDURE — 6370000000 HC RX 637 (ALT 250 FOR IP): Performed by: INTERNAL MEDICINE

## 2024-07-12 PROCEDURE — 6370000000 HC RX 637 (ALT 250 FOR IP): Performed by: NURSE PRACTITIONER

## 2024-07-12 PROCEDURE — 6360000002 HC RX W HCPCS: Performed by: INTERNAL MEDICINE

## 2024-07-12 PROCEDURE — 85025 COMPLETE CBC W/AUTO DIFF WBC: CPT

## 2024-07-12 PROCEDURE — 36415 COLL VENOUS BLD VENIPUNCTURE: CPT

## 2024-07-12 PROCEDURE — 1100000000 HC RM PRIVATE

## 2024-07-12 PROCEDURE — 2580000003 HC RX 258: Performed by: INTERNAL MEDICINE

## 2024-07-12 PROCEDURE — 83735 ASSAY OF MAGNESIUM: CPT

## 2024-07-12 PROCEDURE — 97530 THERAPEUTIC ACTIVITIES: CPT

## 2024-07-12 PROCEDURE — 97161 PT EVAL LOW COMPLEX 20 MIN: CPT

## 2024-07-12 PROCEDURE — 80053 COMPREHEN METABOLIC PANEL: CPT

## 2024-07-12 RX ADMIN — CARVEDILOL 12.5 MG: 12.5 TABLET, FILM COATED ORAL at 09:31

## 2024-07-12 RX ADMIN — RIVAROXABAN 10 MG: 10 TABLET, FILM COATED ORAL at 09:31

## 2024-07-12 RX ADMIN — SODIUM CHLORIDE, PRESERVATIVE FREE 10 ML: 5 INJECTION INTRAVENOUS at 21:44

## 2024-07-12 RX ADMIN — HYDROCODONE BITARTRATE AND ACETAMINOPHEN 1 TABLET: 5; 325 TABLET ORAL at 05:41

## 2024-07-12 RX ADMIN — HYDROCODONE BITARTRATE AND ACETAMINOPHEN 1 TABLET: 5; 325 TABLET ORAL at 21:43

## 2024-07-12 RX ADMIN — CARVEDILOL 12.5 MG: 12.5 TABLET, FILM COATED ORAL at 21:43

## 2024-07-12 RX ADMIN — ALLOPURINOL 300 MG: 100 TABLET ORAL at 09:31

## 2024-07-12 RX ADMIN — HYDROCODONE BITARTRATE AND ACETAMINOPHEN 1 TABLET: 5; 325 TABLET ORAL at 13:17

## 2024-07-12 RX ADMIN — FUROSEMIDE 40 MG: 10 INJECTION, SOLUTION INTRAMUSCULAR; INTRAVENOUS at 09:31

## 2024-07-12 RX ADMIN — SODIUM CHLORIDE, PRESERVATIVE FREE 10 ML: 5 INJECTION INTRAVENOUS at 09:31

## 2024-07-12 ASSESSMENT — PAIN DESCRIPTION - LOCATION
LOCATION: BACK
LOCATION: BACK

## 2024-07-12 ASSESSMENT — PAIN DESCRIPTION - ORIENTATION: ORIENTATION: RIGHT

## 2024-07-12 ASSESSMENT — PAIN SCALES - GENERAL
PAINLEVEL_OUTOF10: 8
PAINLEVEL_OUTOF10: 8
PAINLEVEL_OUTOF10: 2
PAINLEVEL_OUTOF10: 8

## 2024-07-12 ASSESSMENT — PAIN SCALES - WONG BAKER: WONGBAKER_NUMERICALRESPONSE: NO HURT

## 2024-07-12 ASSESSMENT — PAIN DESCRIPTION - DESCRIPTORS: DESCRIPTORS: ACHING

## 2024-07-12 NOTE — PROGRESS NOTES
Progress Note      7/12/2024 12:04 PM  NAME: Isamar Isidro   MRN:[id number]   Admit Diagnosis: Shortness of breath [R06.02]  Pleural effusion [J90]  Back pain of thoracolumbar region [M54.50, M54.6]  Bilateral lower extremity edema [R60.0]      Subjective:   Chart reviewed.  Patient says he still has some leg edema.  Denied any chest pain or shortness of breath.    Review of Systems:    Symptom Y/N Comments  Symptom Y/N Comments   Fever/Chills n   Chest Pain n    Poor Appetite    Edema y    Cough    Abdominal Pain     Sputum    Joint Pain     SOB/SANCHEZ n   Pruritis/Rash     Nausea/vomit    Other     Diarrhea         Constipation           Could NOT obtain due to:      Objective:          Physical Exam:    Last 24hrs VS reviewed since prior progress note. Most recent are:    /72   Pulse 57   Temp 98 °F (36.7 °C) (Oral)   Resp 18   Ht 1.651 m (5' 5\")   Wt 40.8 kg (90 lb)   SpO2 95%   BMI 14.98 kg/m²   No intake or output data in the 24 hours ending 07/12/24 1204     General Appearance: Well developed, well nourished, alert & oriented x 3,    no acute distress.  Ears/Nose/Mouth/Throat: Hearing grossly normal.  Neck: Supple.  Chest: Lungs clear to auscultation bilaterally.  Cardiovascular: Regular rate and rhythm, S1,S2 normal, no murmur.  Abdomen: Soft, non-tender, bowel sounds are active.  Extremities: No edema bilaterally.  Skin: Warm and dry.    []         Post-cath site without hematoma, bruit, tenderness, or thrill.  Distal pulses intact.    PMH/SH reviewed - no change compared to H&P    Data Review    Telemetry: normal sinus rhythm     EKG:   []  No new EKG for review    Lab Data Personally Reviewed:    Recent Labs     07/11/24  0606 07/12/24  0648   WBC 3.1* 3.6   HGB 10.6* 10.7*   HCT 34.7* 34.5*   PLT 84* 71*     No results for input(s): \"INR\", \"PROTIME\", \"APTT\" in the last 72 hours.   Recent Labs     07/10/24  1447 07/11/24  0606 07/12/24  0648   * 146* 142   K 3.7 3.2* 3.3*   CL

## 2024-07-12 NOTE — PLAN OF CARE
Problem: Skin/Tissue Integrity  Goal: Absence of new skin breakdown  Description: 1.  Monitor for areas of redness and/or skin breakdown  2.  Assess vascular access sites hourly  3.  Every 4-6 hours minimum:  Change oxygen saturation probe site  4.  Every 4-6 hours:  If on nasal continuous positive airway pressure, respiratory therapy assess nares and determine need for appliance change or resting period.  Outcome: Progressing     Problem: Safety - Adult  Goal: Free from fall injury  Outcome: Progressing     Problem: Discharge Planning  Goal: Discharge to home or other facility with appropriate resources  Outcome: Progressing     Problem: Pain  Goal: Verbalizes/displays adequate comfort level or baseline comfort level  Outcome: Progressing     Problem: Physical Therapy - Adult  Goal: By Discharge: Performs mobility at highest level of function for planned discharge setting.  See evaluation for individualized goals.  Description: FUNCTIONAL STATUS PRIOR TO ADMISSION: Patient was modified independent using a quad cane for functional mobility.    HOME SUPPORT PRIOR TO ADMISSION: The patient lived alone with daughter and niece to provide assistance.    Physical Therapy Goals  Initiated 7/12/2024  Pt stated goal: to go home  Pt will be I with LE HEP in 7 days.  Pt will perform bed mobility with Modified Saline in 7 days.  Pt will perform transfers with Modified Saline in 7 days.   Pt will amb 50-75 feet with LRAD safely with Contact Guard Assist in 7 days.  Pt will ascend/descend 3 steps with bilateral handrail(s) and Contact Guard Assist in 7 days to safely enter/navigate home.   Pt will demonstrate improvement in standing balance from fair to good in 7 days.     7/12/2024 1341 by Nury Winter, PT  Outcome: Progressing

## 2024-07-12 NOTE — PLAN OF CARE
PHYSICAL THERAPY EVALUATION  Patient: Isamar Isidro (78 y.o. female)  Date: 7/12/2024  Primary Diagnosis: Shortness of breath [R06.02]  Pleural effusion [J90]  Back pain of thoracolumbar region [M54.50, M54.6]  Bilateral lower extremity edema [R60.0]       Precautions: Fall Risk, Bed Alarm                      Recommendations for nursing mobility: Out of bed to chair for meals, Encourage HEP in prep for ADLs/mobility; see handout for details, Use of BSC for toileting , AD and gt belt for bed to chair , and Assist x1    In place during session: External Catheter and EKG/telemetry     ASSESSMENT  Pt is a 78 y.o. female admitted on 7/10/2024 for worsening LE edema, generalized fatigue; PMHx multiple myeloma, HTN. MRI 7/11/2024 showed chronic compression deformities throughout the majority of the thoracolumbar spine with severe kyphosis of the thoracic spine; pt currently being treated for SOB . Pt semi supine upon PT arrival, agreeable to evaluation. Pt A&O x 4.     Based on the objective data described below, the patient currently presents with impaired functional mobility, decreased independence in ADLs, impaired strength, decreased activity tolerance, impaired balance, impaired posture, and increased pain levels. (See below for objective details and assist levels).     Overall pt tolerated session fair today with c/o 7/10 back pain at rest, unchanged with functional mobility. Pt required add'l time and SBA for bed mobility and CGA for all functional transfers. Pt amb 3 feet with RW, gt belt and CGA; demonstrates narrow RYANN, slow vinita and shuffled gait pattern, overall unsteadiness and slight SOB with exertion, no overt LOB or knee buckling noted. Pt will benefit from continued skilled PT to address above deficits and return to PLOF. Potential barriers for safe discharge: pt lives alone, pts available support system works or is unable to provide adequate supervision leaving the patient alone at times during  Verbal cues;Safety awareness training  Sit to Stand: Contact-guard assistance;Assist X1  Stand to Sit: Contact-guard assistance;Assist X1  Bed to Chair: Contact-guard assistance;Assist X1;Adaptive equipment;Additional time  Balance:               Balance  Sitting: Impaired  Sitting - Static: Good (unsupported)  Sitting - Dynamic: Fair (occasional)  Wheelchair Mobility:        Ambulation/Gait Training:                                Gait  Gait Training: Yes  Overall Level of Assistance: Contact-guard assistance;Assist X1;Adaptive equipment  Distance (ft): 3 Feet  Assistive Device: Gait belt;Walker, rolling  Interventions: Verbal cues;Safety awareness training  Base of Support: Narrowed  Speed/Donna: Slow;Shuffled  Step Length: Left shortened;Right shortened  Gait Abnormalities: Decreased step clearance                   Therapeutic Intervention provided:   bed mobility , EOB transfers, OOB transfers, and amb with AD    Functional Measure:  [facility]-PAC™ “6 Clicks”         Basic Mobility Inpatient Short Form  How much difficulty does the patient currently have... Unable A Lot A Little None   1.  Turning over in bed (including adjusting bedclothes, sheets and blankets)?   [] 1   [] 2   [] 3   [x] 4   2.  Sitting down on and standing up from a chair with arms ( e.g., wheelchair, bedside commode, etc.)   [] 1   [] 2   [] 3   [x] 4   3.  Moving from lying on back to sitting on the side of the bed?   [] 1   [] 2   [] 3   [x] 4          How much help from another person does the patient currently need... Total A Lot A Little None   4.  Moving to and from a bed to a chair (including a wheelchair)?   [] 1   [] 2   [x] 3   [] 4   5.  Need to walk in hospital room?   [] 1   [] 2   [x] 3   [] 4   6.  Climbing 3-5 steps with a railing?   [] 1   [] 2   [x] 3   [] 4   © 2007, Trustees of [facility], under license to [employer]. All rights reserved     Score:  Initial: 21/24 Most Recent: X (Date: 7/12/2024 )

## 2024-07-12 NOTE — PROGRESS NOTES
Hospitalist Progress Note    NAME:   Isamar Isidro   : 1945   MRN: [id number]     Date/Time: 2024 1:51 PM  Patient PCP: Leandro Rodríguez MD    Estimated discharge date: 24 hours  Barriers: [facility]      HOSPITAL COURSE:  Isamar Isidro is a 79 yo female with arthritis, HTN, multiple myeloma (autologous treatment in ) currently receiving daratumumab, and thromboembolus who was admitted to the hospital with a chief complaint of generalized weakness which has worsened over the past few weeks. Patient also complains of lower extremity swelling. CTA was negative for PE, showed small bilateral pleural effusions with atelectasis. Chronic PAH, lytic lesions throughout the spine. Echo EF 65-70%, hyperdynamic LV systolic function, normal wall motion, and normal diastolic function. MRI showed no evidence of cord compression. Chronic fractures. Patient accepted by .    .     Assessment / Plan:    Acute CHF exacerbation  Cardiology following, appreciate recommendations  Cxr: small left pleural effusion,    Echo resulted above  Continue IV lasix  Continue tele    HTN   Blood pressure acceptable  Continue Coreg and lasix    Multiple myeloma  Oncology following, appreciate recommendations  Autologous bone marrow transplant   Followed at [facility], primary oncologist, Dr. Camacho  Continue Xarelto and Velcade shots, per oncology recommendations            Medical Decision Making:     [] High (any 2)    A. Problems (any 1)  [x] Acute/Chronic Illness/injury posing threat to life or bodily function:    [] Severe exacerbation of chronic illness:    ---------------------------------------------------------------------  B. Risk of Treatment (any 1)   [] Drugs/treatments that require intensive monitoring for toxicity include:    [] IV ABX requiring serial renal monitoring for nephrotoxicity:     [] IV Narcotic analgesia for adverse drug reaction  [] Aggressive IV diuresis requiring serial monitoring for renal  93 %   07/11/24 1745 (!) 140/60 97.7 °F (36.5 °C) Oral 62 16 95 %         No intake or output data in the 24 hours ending 07/12/24 1351       I had a face to face encounter and independently examined this patient on 7/12/2024, as outlined below:        PHYSICAL EXAM:  Physical Exam  Vitals and nursing note reviewed.   Constitutional:       General: She is not in acute distress.     Appearance: She is ill-appearing. She is not toxic-appearing or diaphoretic.   Cardiovascular:      Rate and Rhythm: Normal rate and regular rhythm.      Pulses: Normal pulses.      Heart sounds: Normal heart sounds.   Pulmonary:      Effort: Pulmonary effort is normal.      Breath sounds: Normal breath sounds.   Abdominal:      General: Bowel sounds are normal.      Palpations: Abdomen is soft.   Musculoskeletal:         General: Normal range of motion.   Skin:     General: Skin is warm and dry.   Neurological:      Mental Status: She is oriented to person, place, and time. Mental status is at baseline.      Motor: Weakness present.          ________________________________________________________________________    Total NON critical care TIME:  35  Minutes    Total CRITICAL CARE TIME Spent:   Minutes non procedure based      Comments   >50% of visit spent in counseling and coordination of care     ________________________________________________________________________  OMA Garcia NP     Procedures: see electronic medical records for all procedures/Xrays and details which were not copied into this note but were reviewed prior to creation of Plan.      LABS:  I reviewed today's most current labs and imaging studies.  Pertinent labs include:  Recent Labs     07/10/24  1447 07/11/24  0606 07/12/24  0648   WBC 3.7 3.1* 3.6   HGB 11.3* 10.6* 10.7*   HCT 37.0 34.7* 34.5*   PLT 83* 84* 71*       Recent Labs     07/10/24  1447 07/11/24  0606 07/12/24  0648   * 146* 142   K 3.7 3.2* 3.3*    105 101   CO2 36* 40* 38*   BUN  26* 20 21*   MG  --  1.9 1.8   ALT 41 42 37         Signed: OMA Garcia - NP

## 2024-07-12 NOTE — PROGRESS NOTES
Hematology/Oncology   Progress Note    Patient: Isamar Isidro MRN: [id number]     YOB: 1945  Age: 78 y.o.  Sex: female      Admit Date: 7/10/2024    LOS: 2 days     Chief Complaint: Back pain  Reason for consult: Multiple myeloma    Subjective:     Patient resting in bed, No acute distress.  Patient denies chest pain, shortness of breath, palpitations, bleeding. Patient complains of continued back pain, but states it has improved.    Review of Systems - Negative except when listed in the HPI     Objective:     Vitals:    07/12/24 0439 07/12/24 0541 07/12/24 0611 07/12/24 0747   BP: (!) 140/64   138/72   Pulse: 65   57   Resp: 20 18 18 18   Temp: 98.1 °F (36.7 °C)   98 °F (36.7 °C)   TempSrc: Oral   Oral   SpO2: 97%   95%   Weight:       Height:          Physical Exam:  Constitutional Alert, cooperative. Mood and affect appropriate. Appears close to chronological age.    Eyes Conjunctivae and sclerae are clear and without icterus.    ENMT No oral ulcers, thrush or mucositis.    Neck Supple without masses or thyromegaly. No jugular venous distension.   Respiratory Lungs are clear to auscultation    Cardiovascular Regular rate and rhythm of heart    Abdomen Non-tender, non-distended, no masses, ascites or hepatosplenomegaly. Good bowel sounds. No guarding or rebound tenderness.    Musculoskeletal No tenderness or swelling, normal range of motion    Extremities No visible deformities or edema.    Skin No rashes or lesions suggestive of malignancy. No petechiae, purpura.   Neurologic Alert. Coherent speech. Verbalizes understanding of our discussions today.     Lab/Data Review:  Recent Labs     07/10/24  1447 07/11/24  0606 07/12/24  0648   WBC 3.7 3.1* 3.6   HGB 11.3* 10.6* 10.7*   HCT 37.0 34.7* 34.5*   PLT 83* 84* 71*     Recent Labs     07/10/24  1447 07/11/24  0606 07/12/24  0648   * 146* 142   K 3.7 3.2* 3.3*    105 101   CO2 36* 40* 38*   BUN 26* 20 21*   MG  --  1.9 1.8   ALT 41  42 37     No results for input(s): \"PH\", \"PCO2\", \"PO2\", \"HCO3\", \"FIO2\" in the last 72 hours.  Recent Results (from the past 24 hour(s))   CBC with Auto Differential    Collection Time: 07/12/24  6:48 AM   Result Value Ref Range    WBC 3.6 3.6 - 11.0 K/uL    RBC 3.38 (L) 3.80 - 5.20 M/uL    Hemoglobin 10.7 (L) 11.5 - 16.0 g/dL    Hematocrit 34.5 (L) 35.0 - 47.0 %    .1 (H) 80.0 - 99.0 FL    MCH 31.7 26.0 - 34.0 PG    MCHC 31.0 30.0 - 36.5 g/dL    RDW 14.9 (H) 11.5 - 14.5 %    Platelets 71 (L) 150 - 400 K/uL    Nucleated RBCs 0.0 0.0  WBC    nRBC 0.00 0.00 - 0.01 K/uL    Neutrophils % 51 32 - 75 %    Lymphocytes % 36 12 - 49 %    Monocytes % 13 5 - 13 %    Eosinophils % 0 0 - 7 %    Basophils % 0 0 - 1 %    Immature Granulocytes % 0 0 - 0.5 %    Neutrophils Absolute 1.8 1.8 - 8.0 K/UL    Lymphocytes Absolute 1.3 0.8 - 3.5 K/UL    Monocytes Absolute 0.5 0.0 - 1.0 K/UL    Eosinophils Absolute 0.0 0.0 - 0.4 K/UL    Basophils Absolute 0.0 0.0 - 0.1 K/UL    Immature Granulocytes Absolute 0.0 0.00 - 0.04 K/UL    Differential Type AUTOMATED     Comprehensive Metabolic Panel    Collection Time: 07/12/24  6:48 AM   Result Value Ref Range    Sodium 142 136 - 145 mmol/L    Potassium 3.3 (L) 3.5 - 5.1 mmol/L    Chloride 101 97 - 108 mmol/L    CO2 38 (H) 21 - 32 mmol/L    Anion Gap 3 (L) 5 - 15 mmol/L    Glucose 93 65 - 100 mg/dL    BUN 21 (H) 6 - 20 mg/dL    Creatinine 0.54 (L) 0.55 - 1.02 mg/dL    BUN/Creatinine Ratio 39 (H) 12 - 20      Est, Glom Filt Rate >90 >60 ml/min/1.73m2    Calcium 9.7 8.5 - 10.1 mg/dL    Total Bilirubin 0.5 0.2 - 1.0 mg/dL    AST 31 15 - 37 U/L    ALT 37 12 - 78 U/L    Alk Phosphatase 137 (H) 45 - 117 U/L    Total Protein 4.9 (L) 6.4 - 8.2 g/dL    Albumin 2.8 (L) 3.5 - 5.0 g/dL    Globulin 2.1 2.0 - 4.0 g/dL    Albumin/Globulin Ratio 1.3 1.1 - 2.2     Magnesium    Collection Time: 07/12/24  6:48 AM   Result Value Ref Range    Magnesium 1.8 1.6 - 2.4 mg/dL     MRI THORACIC SPINE WO CONTRAST  7/11/2024   MRI LUMBAR SPINE WO CONTRAST   IMPRESSION:  1. Interval development of diffusely abnormal bone marrow consistent with  multiple myeloma or diffuse metastases.  2. Chronic compression deformities throughout the majority of the thoracolumbar  spine with severe kyphosis of the thoracic spine.    Assessment and Plan:     IgG-kappa Multiple myeloma  -Had autologous treatment in 2011  -Currently on daratumumab monotherapy with Dr. Camacho; Last administered June 19  -MRI of thoracic and Lumbar spine reviewed     Thrombocytopenia  Anemia   -related to MM  -has had thrombocytopenia since 2016  -Continue to monitor counts  -Transfuse platelets for platelet count < 10,000 or < 20,000 with any evidence of bleeding  -Transfuse for hemoglobin < 7 g/dL     Next appointment with Dr. Camacho is scheduled for July 17, advised patient to keep this follow-up.    Signed By: OMA Jasso - CNP     July 12, 2024

## 2024-07-13 LAB
ALBUMIN SERPL-MCNC: 2.8 G/DL (ref 3.5–5)
ALBUMIN/GLOB SERPL: 1.2 (ref 1.1–2.2)
ALP SERPL-CCNC: 141 U/L (ref 45–117)
ALT SERPL-CCNC: 34 U/L (ref 12–78)
ANION GAP SERPL CALC-SCNC: 3 MMOL/L (ref 5–15)
AST SERPL W P-5'-P-CCNC: 25 U/L (ref 15–37)
BASOPHILS # BLD: 0 K/UL (ref 0–0.1)
BASOPHILS NFR BLD: 0 % (ref 0–1)
BILIRUB SERPL-MCNC: 0.5 MG/DL (ref 0.2–1)
BUN SERPL-MCNC: 26 MG/DL (ref 6–20)
BUN/CREAT SERPL: 43 (ref 12–20)
CA-I BLD-MCNC: 9.8 MG/DL (ref 8.5–10.1)
CHLORIDE SERPL-SCNC: 97 MMOL/L (ref 97–108)
CO2 SERPL-SCNC: 39 MMOL/L (ref 21–32)
CREAT SERPL-MCNC: 0.6 MG/DL (ref 0.55–1.02)
DIFFERENTIAL METHOD BLD: ABNORMAL
EOSINOPHIL # BLD: 0 K/UL (ref 0–0.4)
EOSINOPHIL NFR BLD: 0 % (ref 0–7)
ERYTHROCYTE [DISTWIDTH] IN BLOOD BY AUTOMATED COUNT: 15 % (ref 11.5–14.5)
GLOBULIN SER CALC-MCNC: 2.4 G/DL (ref 2–4)
GLUCOSE SERPL-MCNC: 92 MG/DL (ref 65–100)
HCT VFR BLD AUTO: 35.5 % (ref 35–47)
HGB BLD-MCNC: 11 G/DL (ref 11.5–16)
IMM GRANULOCYTES # BLD AUTO: 0 K/UL (ref 0–0.04)
IMM GRANULOCYTES NFR BLD AUTO: 0 % (ref 0–0.5)
LYMPHOCYTES # BLD: 1.6 K/UL (ref 0.8–3.5)
LYMPHOCYTES NFR BLD: 42 % (ref 12–49)
MAGNESIUM SERPL-MCNC: 1.9 MG/DL (ref 1.6–2.4)
MCH RBC QN AUTO: 31.6 PG (ref 26–34)
MCHC RBC AUTO-ENTMCNC: 31 G/DL (ref 30–36.5)
MCV RBC AUTO: 102 FL (ref 80–99)
MONOCYTES # BLD: 0.5 K/UL (ref 0–1)
MONOCYTES NFR BLD: 14 % (ref 5–13)
NEUTS SEG # BLD: 1.7 K/UL (ref 1.8–8)
NEUTS SEG NFR BLD: 44 % (ref 32–75)
NRBC # BLD: 0 K/UL (ref 0–0.01)
NRBC BLD-RTO: 0 PER 100 WBC
PLATELET # BLD AUTO: 80 K/UL (ref 150–400)
PMV BLD AUTO: 13 FL (ref 8.9–12.9)
POTASSIUM SERPL-SCNC: 3.5 MMOL/L (ref 3.5–5.1)
PROT SERPL-MCNC: 5.2 G/DL (ref 6.4–8.2)
RBC # BLD AUTO: 3.48 M/UL (ref 3.8–5.2)
SODIUM SERPL-SCNC: 139 MMOL/L (ref 136–145)
WBC # BLD AUTO: 3.8 K/UL (ref 3.6–11)

## 2024-07-13 PROCEDURE — 6370000000 HC RX 637 (ALT 250 FOR IP): Performed by: INTERNAL MEDICINE

## 2024-07-13 PROCEDURE — 80053 COMPREHEN METABOLIC PANEL: CPT

## 2024-07-13 PROCEDURE — 36415 COLL VENOUS BLD VENIPUNCTURE: CPT

## 2024-07-13 PROCEDURE — 83735 ASSAY OF MAGNESIUM: CPT

## 2024-07-13 PROCEDURE — 6370000000 HC RX 637 (ALT 250 FOR IP): Performed by: NURSE PRACTITIONER

## 2024-07-13 PROCEDURE — 1100000000 HC RM PRIVATE

## 2024-07-13 PROCEDURE — 2580000003 HC RX 258: Performed by: INTERNAL MEDICINE

## 2024-07-13 PROCEDURE — 85025 COMPLETE CBC W/AUTO DIFF WBC: CPT

## 2024-07-13 PROCEDURE — 94761 N-INVAS EAR/PLS OXIMETRY MLT: CPT

## 2024-07-13 PROCEDURE — 6360000002 HC RX W HCPCS: Performed by: INTERNAL MEDICINE

## 2024-07-13 RX ADMIN — CARVEDILOL 12.5 MG: 12.5 TABLET, FILM COATED ORAL at 09:22

## 2024-07-13 RX ADMIN — HYDROCODONE BITARTRATE AND ACETAMINOPHEN 1 TABLET: 5; 325 TABLET ORAL at 14:53

## 2024-07-13 RX ADMIN — ALLOPURINOL 300 MG: 100 TABLET ORAL at 09:23

## 2024-07-13 RX ADMIN — HYDROCODONE BITARTRATE AND ACETAMINOPHEN 1 TABLET: 5; 325 TABLET ORAL at 04:37

## 2024-07-13 RX ADMIN — SODIUM CHLORIDE, PRESERVATIVE FREE 10 ML: 5 INJECTION INTRAVENOUS at 09:23

## 2024-07-13 RX ADMIN — FUROSEMIDE 40 MG: 10 INJECTION, SOLUTION INTRAMUSCULAR; INTRAVENOUS at 09:23

## 2024-07-13 RX ADMIN — RIVAROXABAN 10 MG: 10 TABLET, FILM COATED ORAL at 09:22

## 2024-07-13 RX ADMIN — CARVEDILOL 12.5 MG: 12.5 TABLET, FILM COATED ORAL at 20:39

## 2024-07-13 RX ADMIN — HYDROCODONE BITARTRATE AND ACETAMINOPHEN 1 TABLET: 5; 325 TABLET ORAL at 20:39

## 2024-07-13 ASSESSMENT — PAIN SCALES - WONG BAKER
WONGBAKER_NUMERICALRESPONSE: NO HURT
WONGBAKER_NUMERICALRESPONSE: NO HURT

## 2024-07-13 ASSESSMENT — PAIN SCALES - GENERAL
PAINLEVEL_OUTOF10: 7
PAINLEVEL_OUTOF10: 2
PAINLEVEL_OUTOF10: 9

## 2024-07-13 ASSESSMENT — PAIN DESCRIPTION - LOCATION
LOCATION: BACK
LOCATION: BACK

## 2024-07-13 ASSESSMENT — PAIN DESCRIPTION - DESCRIPTORS: DESCRIPTORS: ACHING

## 2024-07-13 NOTE — PROGRESS NOTES
In to see the Patient at this time. Patient upset that Nurse does not stay in her room at all times.

## 2024-07-13 NOTE — PROGRESS NOTES
In Patients room giving the Patient some bottled water and talking about her going home with HH on Sunday. Patient resting in bed and without pain.

## 2024-07-13 NOTE — PROGRESS NOTES
Just left Patients room. Patient called the desk requesting  to speak with the manager. Patient stated that she has not seen her Nurse. Nurse was in room administering medications a few minutes prior to the Patient saying she has not seen her Nurse.

## 2024-07-13 NOTE — DISCHARGE SUMMARY
Discharge Summary    Name: Isamar Isidro  [id number]  YOB: 1945 (Age: 78 y.o.)   Date of Admission: 7/10/2024  Date of Discharge: 7/13/2024  Attending Physician: Ervin Caro MD    Discharge Diagnosis:   Principal Problem:    Shortness of breath  Resolved Problems:    * No resolved hospital problems. *       Consultations:  IP WOUND CARE NURSE CONSULT TO EVAL  IP CONSULT TO ONCOLOGY  IP CONSULT TO CARDIOLOGY  IP CONSULT TO CASE MANAGEMENT      Brief Admission History/Reason for Admission Per James Seipp, DO:     Isamar Isidro is a 79 yo female with arthritis, HTN, multiple myeloma (autologous treatment in 2011) currently receiving daratumumab, and thromboembolus who was admitted to the hospital with a chief complaint of generalized weakness which has worsened over the past few weeks. Patient also complains of lower extremity swelling. CTA was negative for PE, showed small bilateral pleural effusions with atelectasis. Chronic PAH, lytic lesions throughout the spine. Echo EF 65-70%, hyperdynamic LV systolic function, normal wall motion, and normal diastolic function. MRI showed no evidence of cord compression. Chronic fractures. Patient is clinically stable for discharge on 07/14. Patient will discharge with  and services will begin on 07/15.       Brief Hospital Course by Main Problems:     Acute CHF exacerbation: Please continue Coreg daily and lasix as needed. Please follow-up with primary cardiologist in 2 weeks.        HTN: Blood pressure is acceptable at the time of discharge.  Please continue Coreg. Please follow-up with PCP for further management.       Multiple myeloma: Please follow-up with oncology as scheduled. Please continue Xarelto and Velcade shots per oncology recommendations.       Discharge Exam:  Patient seen and examined by me on discharge day.  Pertinent Findings:  Patient Vitals for the past 24 hrs:   BP Temp Temp src Pulse Resp

## 2024-07-13 NOTE — PLAN OF CARE
Problem: Skin/Tissue Integrity  Goal: Absence of new skin breakdown  Description: 1.  Monitor for areas of redness and/or skin breakdown  2.  Assess vascular access sites hourly  3.  Every 4-6 hours minimum:  Change oxygen saturation probe site  4.  Every 4-6 hours:  If on nasal continuous positive airway pressure, respiratory therapy assess nares and determine need for appliance change or resting period.  Outcome: Progressing     Problem: Safety - Adult  Goal: Free from fall injury  Outcome: Progressing     Problem: Discharge Planning  Goal: Discharge to home or other facility with appropriate resources  Outcome: Progressing     Problem: Pain  Goal: Verbalizes/displays adequate comfort level or baseline comfort level  Outcome: Progressing

## 2024-07-13 NOTE — CARE COORDINATION
CM reviewed chart. DCP Home with Tray  on 7/14, home health begins on 7/15. Cm will continue to follow.

## 2024-07-13 NOTE — PROGRESS NOTES
Progress Note      7/13/2024 1:35 PM  NAME: Isamar Isidro   MRN:[id number]   Admit Diagnosis: Shortness of breath [R06.02]  Pleural effusion [J90]  Back pain of thoracolumbar region [M54.50, M54.6]  Bilateral lower extremity edema [R60.0]      Subjective:   Chart reviewed.  Patient says he still has some leg edema.  Denied any chest pain or shortness of breath.  Leg edema has resolved    Review of Systems:    Symptom Y/N Comments  Symptom Y/N Comments   Fever/Chills n   Chest Pain n    Poor Appetite    Edema y Improving   Cough    Abdominal Pain     Sputum    Joint Pain     SOB/SANCHEZ n   Pruritis/Rash     Nausea/vomit    Other     Diarrhea         Constipation           Could NOT obtain due to:      Objective:          Physical Exam:    Last 24hrs VS reviewed since prior progress note. Most recent are:    BP (!) 108/58 Comment: Loli  was inform her about  b/p  Pulse 80   Temp 98.2 °F (36.8 °C) (Oral)   Resp 20   Ht 1.651 m (5' 5\")   Wt 40.8 kg (90 lb)   SpO2 100%   BMI 14.98 kg/m²     Intake/Output Summary (Last 24 hours) at 7/13/2024 1335  Last data filed at 7/13/2024 0923  Gross per 24 hour   Intake 450 ml   Output 550 ml   Net -100 ml        General Appearance: Well developed, well nourished, alert & oriented x 3,    no acute distress.  Ears/Nose/Mouth/Throat: Hearing grossly normal.  Neck: Supple.  Chest: Lungs clear to auscultation bilaterally.  Cardiovascular: Regular rate and rhythm, S1,S2 normal, no murmur.  Abdomen: Soft, non-tender, bowel sounds are active.  Extremities: Trace edema bilaterally.  Skin: Warm and dry.    []         Post-cath site without hematoma, bruit, tenderness, or thrill.  Distal pulses intact.    PMH/SH reviewed - no change compared to H&P    Data Review    Telemetry: normal sinus rhythm     EKG:   []  No new EKG for review    Lab Data Personally Reviewed:    Recent Labs     07/12/24  0648 07/13/24  0618   WBC 3.6 3.8   HGB 10.7* 11.0*   HCT 34.5* 35.5   PLT 71* 80*        No results for input(s): \"INR\", \"PROTIME\", \"APTT\" in the last 72 hours.   Recent Labs     07/11/24  0606 07/12/24 0648 07/13/24  0618   * 142 139   K 3.2* 3.3* 3.5    101 97   CO2 40* 38* 39*   BUN 20 21* 26*   CREATININE 0.61 0.54* 0.60   GLUCOSE 88 93 92   CALCIUM 9.6 9.7 9.8   MG 1.9 1.8 1.9       Recent Labs     07/10/24  1447   NTPROBNP 854*       No results found for: \"CHOL\", \"TRIG\", \"HDL\", \"VLDL\", \"CHOLHDLRATIO\"    Recent Labs     07/11/24  0606 07/12/24 0648 07/13/24 0618   AST 35 31 25   ALKPHOS 148* 137* 141*   GLOB 2.4 2.1 2.4       No results for input(s): \"PH\", \"PCO2\", \"PO2\" in the last 72 hours.    Medications Personally Reviewed:    Current Facility-Administered Medications   Medication Dose Route Frequency    allopurinol (ZYLOPRIM) tablet 300 mg  300 mg Oral Daily    carvedilol (COREG) tablet 12.5 mg  12.5 mg Oral BID    rivaroxaban (XARELTO) tablet 10 mg  10 mg Oral Daily with breakfast    HYDROcodone-acetaminophen (NORCO) 5-325 MG per tablet 1 tablet  1 tablet Oral Q6H PRN    sodium chloride flush 0.9 % injection 5-40 mL  5-40 mL IntraVENous 2 times per day    sodium chloride flush 0.9 % injection 5-40 mL  5-40 mL IntraVENous PRN    0.9 % sodium chloride infusion   IntraVENous PRN    potassium chloride (KLOR-CON M) extended release tablet 40 mEq  40 mEq Oral PRN    Or    potassium bicarb-citric acid (EFFER-K) effervescent tablet 40 mEq  40 mEq Oral PRN    Or    potassium chloride 10 mEq/100 mL IVPB (Peripheral Line)  10 mEq IntraVENous PRN    magnesium sulfate 2000 mg in 50 mL IVPB premix  2,000 mg IntraVENous PRN    ondansetron (ZOFRAN-ODT) disintegrating tablet 4 mg  4 mg Oral Q8H PRN    Or    ondansetron (ZOFRAN) injection 4 mg  4 mg IntraVENous Q6H PRN    polyethylene glycol (GLYCOLAX) packet 17 g  17 g Oral Daily PRN    acetaminophen (TYLENOL) tablet 650 mg  650 mg Oral Q6H PRN    Or    acetaminophen (TYLENOL) suppository 650 mg  650 mg Rectal Q6H PRN    furosemide (LASIX)

## 2024-07-14 LAB
ALBUMIN SERPL-MCNC: 2.8 G/DL (ref 3.5–5)
ALBUMIN/GLOB SERPL: 1.2 (ref 1.1–2.2)
ALP SERPL-CCNC: 139 U/L (ref 45–117)
ALT SERPL-CCNC: 30 U/L (ref 12–78)
ANION GAP SERPL CALC-SCNC: 4 MMOL/L (ref 5–15)
AST SERPL W P-5'-P-CCNC: 21 U/L (ref 15–37)
BASOPHILS # BLD: 0 K/UL (ref 0–0.1)
BASOPHILS NFR BLD: 0 % (ref 0–1)
BILIRUB SERPL-MCNC: 0.4 MG/DL (ref 0.2–1)
BUN SERPL-MCNC: 26 MG/DL (ref 6–20)
BUN/CREAT SERPL: 44 (ref 12–20)
CA-I BLD-MCNC: 10.3 MG/DL (ref 8.5–10.1)
CHLORIDE SERPL-SCNC: 98 MMOL/L (ref 97–108)
CO2 SERPL-SCNC: 39 MMOL/L (ref 21–32)
CREAT SERPL-MCNC: 0.59 MG/DL (ref 0.55–1.02)
DIFFERENTIAL METHOD BLD: ABNORMAL
EOSINOPHIL # BLD: 0 K/UL (ref 0–0.4)
EOSINOPHIL NFR BLD: 0 % (ref 0–7)
ERYTHROCYTE [DISTWIDTH] IN BLOOD BY AUTOMATED COUNT: 15.1 % (ref 11.5–14.5)
GLOBULIN SER CALC-MCNC: 2.4 G/DL (ref 2–4)
GLUCOSE SERPL-MCNC: 91 MG/DL (ref 65–100)
HCT VFR BLD AUTO: 34.4 % (ref 35–47)
HGB BLD-MCNC: 10.7 G/DL (ref 11.5–16)
IMM GRANULOCYTES # BLD AUTO: 0 K/UL (ref 0–0.04)
IMM GRANULOCYTES NFR BLD AUTO: 0 % (ref 0–0.5)
LYMPHOCYTES # BLD: 1.5 K/UL (ref 0.8–3.5)
LYMPHOCYTES NFR BLD: 42 % (ref 12–49)
MAGNESIUM SERPL-MCNC: 2 MG/DL (ref 1.6–2.4)
MCH RBC QN AUTO: 31.5 PG (ref 26–34)
MCHC RBC AUTO-ENTMCNC: 31.1 G/DL (ref 30–36.5)
MCV RBC AUTO: 101.2 FL (ref 80–99)
MONOCYTES # BLD: 0.5 K/UL (ref 0–1)
MONOCYTES NFR BLD: 13 % (ref 5–13)
NEUTS SEG # BLD: 1.6 K/UL (ref 1.8–8)
NEUTS SEG NFR BLD: 45 % (ref 32–75)
NRBC # BLD: 0 K/UL (ref 0–0.01)
NRBC BLD-RTO: 0 PER 100 WBC
PLATELET # BLD AUTO: 87 K/UL (ref 150–400)
POTASSIUM SERPL-SCNC: 3.4 MMOL/L (ref 3.5–5.1)
PROT SERPL-MCNC: 5.2 G/DL (ref 6.4–8.2)
RBC # BLD AUTO: 3.4 M/UL (ref 3.8–5.2)
SODIUM SERPL-SCNC: 141 MMOL/L (ref 136–145)
WBC # BLD AUTO: 3.6 K/UL (ref 3.6–11)

## 2024-07-14 PROCEDURE — 80053 COMPREHEN METABOLIC PANEL: CPT

## 2024-07-14 PROCEDURE — 36415 COLL VENOUS BLD VENIPUNCTURE: CPT

## 2024-07-14 PROCEDURE — 6370000000 HC RX 637 (ALT 250 FOR IP): Performed by: NURSE PRACTITIONER

## 2024-07-14 PROCEDURE — 2580000003 HC RX 258: Performed by: INTERNAL MEDICINE

## 2024-07-14 PROCEDURE — 1100000000 HC RM PRIVATE

## 2024-07-14 PROCEDURE — 6370000000 HC RX 637 (ALT 250 FOR IP): Performed by: INTERNAL MEDICINE

## 2024-07-14 PROCEDURE — 83735 ASSAY OF MAGNESIUM: CPT

## 2024-07-14 PROCEDURE — 94761 N-INVAS EAR/PLS OXIMETRY MLT: CPT

## 2024-07-14 PROCEDURE — 85025 COMPLETE CBC W/AUTO DIFF WBC: CPT

## 2024-07-14 RX ADMIN — CARVEDILOL 12.5 MG: 12.5 TABLET, FILM COATED ORAL at 20:50

## 2024-07-14 RX ADMIN — SODIUM CHLORIDE, PRESERVATIVE FREE 10 ML: 5 INJECTION INTRAVENOUS at 08:30

## 2024-07-14 RX ADMIN — HYDROCODONE BITARTRATE AND ACETAMINOPHEN 1 TABLET: 5; 325 TABLET ORAL at 08:59

## 2024-07-14 RX ADMIN — ALLOPURINOL 300 MG: 100 TABLET ORAL at 08:29

## 2024-07-14 RX ADMIN — CARVEDILOL 12.5 MG: 12.5 TABLET, FILM COATED ORAL at 08:29

## 2024-07-14 RX ADMIN — RIVAROXABAN 10 MG: 10 TABLET, FILM COATED ORAL at 08:29

## 2024-07-14 RX ADMIN — HYDROCODONE BITARTRATE AND ACETAMINOPHEN 1 TABLET: 5; 325 TABLET ORAL at 16:18

## 2024-07-14 ASSESSMENT — PAIN SCALES - GENERAL
PAINLEVEL_OUTOF10: 8
PAINLEVEL_OUTOF10: 8

## 2024-07-14 NOTE — CARE COORDINATION
Discharge delayed due to family transportation.  CM revisited SNF as per PT/OT recommendations.  Patient declined.  Attempted to call patients daughter.  No answer.  Unable to leave voicemail.     Tray contacted for another anticipated start date. Awaiting response.

## 2024-07-14 NOTE — CARE COORDINATION
Discharge Noted:  Patient potentially will discharge today.  Patient has been accepted to [facility].    Anticipated start date: 7-.  Discharge order sent to agency through [facility].

## 2024-07-14 NOTE — DISCHARGE SUMMARY
Discharge Summary    Name: Isamar Isidro  [id number]  YOB: 1945 (Age: 78 y.o.)   Date of Admission: 7/10/2024  Date of Discharge: 7/14/2024  Attending Physician: Young Gannon MD    Discharge Diagnosis:   Principal Problem:    Shortness of breath  Resolved Problems:    * No resolved hospital problems. *       Consultations:  IP WOUND CARE NURSE CONSULT TO EVAL  IP CONSULT TO ONCOLOGY  IP CONSULT TO CARDIOLOGY  IP CONSULT TO CASE MANAGEMENT      Brief Admission History/Reason for Admission Per James Seipp, DO:     Isamar Isidro is a 77 yo female with arthritis, HTN, multiple myeloma (autologous treatment in 2011) currently receiving daratumumab, and thromboembolus who was admitted to the hospital with a chief complaint of generalized weakness which has worsened over the past few weeks. Patient also complains of lower extremity swelling. CTA was negative for PE, showed small bilateral pleural effusions with atelectasis. Chronic PAH, lytic lesions throughout the spine. Echo EF 65-70%, hyperdynamic LV systolic function, normal wall motion, and normal diastolic function. MRI showed no evidence of cord compression. Chronic fractures. Patient is clinically stable for discharge on 07/14. Patient will discharge with  and services will begin on 07/15.  Patient is anxious about returning home stating there will be no one there to assist her.     07/14:  Patient is anxious about returning home stating there will be no one there to assist her. Case management at the bedside. Revisited SNF options with the patient and patient declined. Patient's discharge delayed to 07/15 due to transportation issues. Attempted to call patient's daughter, Caroline Isidro on 07/14/24 at 1015, unable to left , mailbox full.     Brief Hospital Course by Main Problems:     Acute CHF exacerbation: Please continue Coreg daily and lasix as needed. Please follow-up with primary cardiologist in 2  weeks.        HTN: Blood pressure is acceptable at the time of discharge.  Please continue Coreg. Please follow-up with PCP for further management.       Multiple myeloma: Please follow-up with oncology as scheduled. Please continue Xarelto and Velcade shots per oncology recommendations.       Discharge Exam:  Patient seen and examined by me on discharge day.  Pertinent Findings:  Patient Vitals for the past 24 hrs:   BP Temp Temp src Pulse Resp SpO2   07/14/24 0423 (!) 136/58 98.2 °F (36.8 °C) Oral 64 18 95 %   07/13/24 2230 129/72 98.2 °F (36.8 °C) Oral 63 20 96 %   07/13/24 2030 (!) 117/58 98.2 °F (36.8 °C) -- 70 19 97 %   07/13/24 1528 (!) 102/50 98.5 °F (36.9 °C) Oral 90 20 99 %   07/13/24 1523 -- -- -- -- 20 --   07/13/24 1453 -- -- -- -- 16 --   07/13/24 1112 (!) 108/58 98.2 °F (36.8 °C) Oral 80 20 100 %   07/13/24 0922 122/72 -- -- 78 -- --   07/13/24 0815 122/72 97.5 °F (36.4 °C) Oral 78 16 98 %         Gen:    Not in distress  Chest: Clear lungs  CVS:   Regular rhythm.  No edema  Abd:  Soft, not distended, not tender  Neuro:    Discharge/Recent Laboratory Results:  Recent Labs     07/13/24  0618      K 3.5   CL 97   CO2 39*   BUN 26*   CREATININE 0.60   GLUCOSE 92   CALCIUM 9.8   MG 1.9       Recent Labs     07/14/24  0640   HGB 10.7*   HCT 34.4*   WBC 3.6   PLT 87*         Discharge Medications:     Medication List        CONTINUE taking these medications      allopurinol 300 MG tablet  Commonly known as: ZYLOPRIM     ALPRAZolam 0.5 MG tablet  Commonly known as: XANAX     carvedilol 12.5 MG tablet  Commonly known as: COREG            STOP taking these medications      dexAMETHasone 4 MG tablet  Commonly known as: DECADRON     Xarelto 10 MG Tabs tablet  Generic drug: rivaroxaban                  DISPOSITION:    Home with Family:       Home with HH/PT/OT/RN: x   SNF/LTC:    KEI:    OTHER:            Code status: full   Recommended diet: regular diet  Recommended activity: activity as tolerated  Wound  care: None      Follow up with:   PCP : Leandro Rodríguez MD  Follow-up Information       Follow up With Specialties Details Why Contact Info    Leandro Rodríguez MD Internal Medicine Schedule an appointment as soon as possible for a visit Please call and schedule an appointment in 2 weeks. [address] [facility] [address] [phone]      Cristy Camacho MD Hematology and Oncology Schedule an appointment as soon as possible for a visit Appointment on 07/17 [address] [place] [address]  [address]  [facility] [id number] [phone]      Gage Lutz MD Cardiothoracic Surgery, Cardiology, Interventional Cardiology Schedule an appointment as soon as possible for a visit in 2 week(s)  [address]  [address]  [facility] [id number] [phone]                  Total time in minutes spent coordinating this discharge (includes going over instructions, follow-up, prescriptions, and preparing report for sign off to her PCP) :  35 minutes

## 2024-07-14 NOTE — PROGRESS NOTES
Progress Note      7/14/2024 12:44 PM  NAME: Isamar Isidro   MRN:[id number]   Admit Diagnosis: Shortness of breath [R06.02]  Pleural effusion [J90]  Back pain of thoracolumbar region [M54.50, M54.6]  Bilateral lower extremity edema [R60.0]      Subjective:   Chart reviewed.  Patient says he still has some leg edema.  Denied any chest pain or shortness of breath.  Leg edema has resolved    Review of Systems:    Symptom Y/N Comments  Symptom Y/N Comments   Fever/Chills n   Chest Pain n    Poor Appetite    Edema y Improving   Cough    Abdominal Pain     Sputum    Joint Pain     SOB/SANCHEZ n   Pruritis/Rash     Nausea/vomit    Other     Diarrhea         Constipation           Could NOT obtain due to:      Objective:          Physical Exam:    Last 24hrs VS reviewed since prior progress note. Most recent are:    BP (!) 94/52 Comment: Serene was notified  Pulse 67   Temp 98.1 °F (36.7 °C) (Oral)   Resp 18   Ht 1.651 m (5' 5\")   Wt 40.8 kg (90 lb)   SpO2 97%   BMI 14.98 kg/m²     Intake/Output Summary (Last 24 hours) at 7/14/2024 1244  Last data filed at 7/14/2024 0423  Gross per 24 hour   Intake 240 ml   Output 700 ml   Net -460 ml          General Appearance: Well developed, well nourished, alert & oriented x 3,    no acute distress.  Ears/Nose/Mouth/Throat: Hearing grossly normal.  Neck: Supple.  Chest: Lungs clear to auscultation bilaterally.  Cardiovascular: Regular rate and rhythm, S1,S2 normal, no murmur.  Abdomen: Soft, non-tender, bowel sounds are active.  Extremities: Trace edema bilaterally.  Skin: Warm and dry.    []         Post-cath site without hematoma, bruit, tenderness, or thrill.  Distal pulses intact.    PMH/SH reviewed - no change compared to H&P    Data Review    Telemetry: normal sinus rhythm     EKG:   []  No new EKG for review    Lab Data Personally Reviewed:    Recent Labs     07/13/24  0618 07/14/24  0640   WBC 3.8 3.6   HGB 11.0* 10.7*   HCT 35.5 34.4*   PLT 80* 87*       No  results for input(s): \"INR\", \"PROTIME\", \"APTT\" in the last 72 hours.   Recent Labs     07/12/24  0648 07/13/24 0618 07/14/24  0640    139 141   K 3.3* 3.5 3.4*    97 98   CO2 38* 39* 39*   BUN 21* 26* 26*   CREATININE 0.54* 0.60 0.59   GLUCOSE 93 92 91   CALCIUM 9.7 9.8 10.3*   MG 1.8 1.9 2.0       No results for input(s): \"CKTOTAL\", \"CKMB\", \"TROPONINI\", \"BNP\", \"PROBNP\", \"NTPROBNP\" in the last 72 hours.    Invalid input(s): \"CKINDEX\"    No results found for: \"CHOL\", \"TRIG\", \"HDL\", \"VLDL\", \"CHOLHDLRATIO\"    Recent Labs     07/12/24  0648 07/13/24 0618 07/14/24  0640   AST 31 25 21   ALKPHOS 137* 141* 139*   GLOB 2.1 2.4 2.4       No results for input(s): \"PH\", \"PCO2\", \"PO2\" in the last 72 hours.    Medications Personally Reviewed:    Current Facility-Administered Medications   Medication Dose Route Frequency    allopurinol (ZYLOPRIM) tablet 300 mg  300 mg Oral Daily    carvedilol (COREG) tablet 12.5 mg  12.5 mg Oral BID    rivaroxaban (XARELTO) tablet 10 mg  10 mg Oral Daily with breakfast    HYDROcodone-acetaminophen (NORCO) 5-325 MG per tablet 1 tablet  1 tablet Oral Q6H PRN    sodium chloride flush 0.9 % injection 5-40 mL  5-40 mL IntraVENous 2 times per day    sodium chloride flush 0.9 % injection 5-40 mL  5-40 mL IntraVENous PRN    0.9 % sodium chloride infusion   IntraVENous PRN    potassium chloride (KLOR-CON M) extended release tablet 40 mEq  40 mEq Oral PRN    Or    potassium bicarb-citric acid (EFFER-K) effervescent tablet 40 mEq  40 mEq Oral PRN    Or    potassium chloride 10 mEq/100 mL IVPB (Peripheral Line)  10 mEq IntraVENous PRN    magnesium sulfate 2000 mg in 50 mL IVPB premix  2,000 mg IntraVENous PRN    ondansetron (ZOFRAN-ODT) disintegrating tablet 4 mg  4 mg Oral Q8H PRN    Or    ondansetron (ZOFRAN) injection 4 mg  4 mg IntraVENous Q6H PRN    polyethylene glycol (GLYCOLAX) packet 17 g  17 g Oral Daily PRN    acetaminophen (TYLENOL) tablet 650 mg  650 mg Oral Q6H PRN    Or     acetaminophen (TYLENOL) suppository 650 mg  650 mg Rectal Q6H PRN    [Held by provider] furosemide (LASIX) injection 40 mg  40 mg IntraVENous Daily     Facility-Administered Medications Ordered in Other Encounters   Medication Dose Route Frequency    0.9 % sodium chloride infusion   IntraVENous PRN           Problem List:   Leg edema seems to be somewhat better.  Hypokalemia has improved.  History of DVT.  Multiple myeloma.       Assessment/Plan:   From cardiac viewpoint I will continue present care.  Will follow hematology/oncology recommendations.  Okay to discharge from cardiac viewpoint.  Thank you.           [x]       High complexity decision making was performed in this patient at high risk for decompensation with multiple organ involvement.    Gage Lutz MD

## 2024-07-15 VITALS
HEIGHT: 65 IN | HEART RATE: 72 BPM | OXYGEN SATURATION: 97 % | DIASTOLIC BLOOD PRESSURE: 62 MMHG | BODY MASS INDEX: 14.99 KG/M2 | SYSTOLIC BLOOD PRESSURE: 96 MMHG | WEIGHT: 90 LBS | TEMPERATURE: 98.4 F | RESPIRATION RATE: 17 BRPM

## 2024-07-15 LAB
ALBUMIN SERPL-MCNC: 2.8 G/DL (ref 3.5–5)
ALBUMIN/GLOB SERPL: 1.1 (ref 1.1–2.2)
ALP SERPL-CCNC: 144 U/L (ref 45–117)
ALT SERPL-CCNC: 36 U/L (ref 12–78)
ANION GAP SERPL CALC-SCNC: 3 MMOL/L (ref 5–15)
AST SERPL W P-5'-P-CCNC: 27 U/L (ref 15–37)
BASOPHILS # BLD: 0 K/UL (ref 0–0.1)
BASOPHILS NFR BLD: 0 % (ref 0–1)
BILIRUB SERPL-MCNC: 0.4 MG/DL (ref 0.2–1)
BUN SERPL-MCNC: 33 MG/DL (ref 6–20)
BUN/CREAT SERPL: 54 (ref 12–20)
CA-I BLD-MCNC: 10.1 MG/DL (ref 8.5–10.1)
CHLORIDE SERPL-SCNC: 99 MMOL/L (ref 97–108)
CO2 SERPL-SCNC: 39 MMOL/L (ref 21–32)
CREAT SERPL-MCNC: 0.61 MG/DL (ref 0.55–1.02)
DIFFERENTIAL METHOD BLD: ABNORMAL
EOSINOPHIL # BLD: 0 K/UL (ref 0–0.4)
EOSINOPHIL NFR BLD: 0 % (ref 0–7)
ERYTHROCYTE [DISTWIDTH] IN BLOOD BY AUTOMATED COUNT: 15.2 % (ref 11.5–14.5)
GLOBULIN SER CALC-MCNC: 2.5 G/DL (ref 2–4)
GLUCOSE SERPL-MCNC: 91 MG/DL (ref 65–100)
HCT VFR BLD AUTO: 33.9 % (ref 35–47)
HGB BLD-MCNC: 10.6 G/DL (ref 11.5–16)
IMM GRANULOCYTES # BLD AUTO: 0 K/UL (ref 0–0.04)
IMM GRANULOCYTES NFR BLD AUTO: 0 % (ref 0–0.5)
LYMPHOCYTES # BLD: 1.6 K/UL (ref 0.8–3.5)
LYMPHOCYTES NFR BLD: 42 % (ref 12–49)
MAGNESIUM SERPL-MCNC: 2 MG/DL (ref 1.6–2.4)
MCH RBC QN AUTO: 31.6 PG (ref 26–34)
MCHC RBC AUTO-ENTMCNC: 31.3 G/DL (ref 30–36.5)
MCV RBC AUTO: 101.2 FL (ref 80–99)
MONOCYTES # BLD: 0.4 K/UL (ref 0–1)
MONOCYTES NFR BLD: 11 % (ref 5–13)
NEUTS SEG # BLD: 1.7 K/UL (ref 1.8–8)
NEUTS SEG NFR BLD: 47 % (ref 32–75)
NRBC # BLD: 0 K/UL (ref 0–0.01)
NRBC BLD-RTO: 0 PER 100 WBC
PLATELET # BLD AUTO: 94 K/UL (ref 150–400)
POTASSIUM SERPL-SCNC: 3.5 MMOL/L (ref 3.5–5.1)
PROT SERPL-MCNC: 5.3 G/DL (ref 6.4–8.2)
RBC # BLD AUTO: 3.35 M/UL (ref 3.8–5.2)
SODIUM SERPL-SCNC: 141 MMOL/L (ref 136–145)
WBC # BLD AUTO: 3.7 K/UL (ref 3.6–11)

## 2024-07-15 PROCEDURE — 6370000000 HC RX 637 (ALT 250 FOR IP): Performed by: NURSE PRACTITIONER

## 2024-07-15 PROCEDURE — 80053 COMPREHEN METABOLIC PANEL: CPT

## 2024-07-15 PROCEDURE — 85025 COMPLETE CBC W/AUTO DIFF WBC: CPT

## 2024-07-15 PROCEDURE — 2580000003 HC RX 258: Performed by: INTERNAL MEDICINE

## 2024-07-15 PROCEDURE — 6370000000 HC RX 637 (ALT 250 FOR IP): Performed by: INTERNAL MEDICINE

## 2024-07-15 PROCEDURE — 83735 ASSAY OF MAGNESIUM: CPT

## 2024-07-15 PROCEDURE — 36415 COLL VENOUS BLD VENIPUNCTURE: CPT

## 2024-07-15 RX ADMIN — HYDROCODONE BITARTRATE AND ACETAMINOPHEN 1 TABLET: 5; 325 TABLET ORAL at 00:46

## 2024-07-15 RX ADMIN — RIVAROXABAN 10 MG: 10 TABLET, FILM COATED ORAL at 08:00

## 2024-07-15 RX ADMIN — ALLOPURINOL 300 MG: 100 TABLET ORAL at 08:52

## 2024-07-15 RX ADMIN — SODIUM CHLORIDE, PRESERVATIVE FREE 10 ML: 5 INJECTION INTRAVENOUS at 08:45

## 2024-07-15 ASSESSMENT — PAIN SCALES - GENERAL
PAINLEVEL_OUTOF10: 8
PAINLEVEL_OUTOF10: 9

## 2024-07-15 NOTE — PROGRESS NOTES
Progress Note      7/15/2024 11:10 AM  NAME: Isamar Isidro   MRN:[id number]   Admit Diagnosis: Shortness of breath [R06.02]  Pleural effusion [J90]  Back pain of thoracolumbar region [M54.50, M54.6]  Bilateral lower extremity edema [R60.0]      Subjective:   Chart reviewed.  Patient says he still has some leg edema.  Denied any chest pain or shortness of breath.  Leg edema has resolved    Review of Systems:    Symptom Y/N Comments  Symptom Y/N Comments   Fever/Chills n   Chest Pain n    Poor Appetite    Edema y Improving   Cough    Abdominal Pain     Sputum    Joint Pain     SOB/SANCHEZ n   Pruritis/Rash     Nausea/vomit    Other     Diarrhea         Constipation           Could NOT obtain due to:      Objective:          Physical Exam:    Last 24hrs VS reviewed since prior progress note. Most recent are:    BP 96/62   Pulse 72   Temp 98.4 °F (36.9 °C)   Resp 17   Ht 1.651 m (5' 5\")   Wt 40.8 kg (90 lb)   SpO2 97%   BMI 14.98 kg/m²   No intake or output data in the 24 hours ending 07/15/24 1110       General Appearance: Well developed, well nourished, alert & oriented x 3,    no acute distress.  Ears/Nose/Mouth/Throat: Hearing grossly normal.  Neck: Supple.  Chest: Lungs clear to auscultation bilaterally.  Cardiovascular: Regular rate and rhythm, S1,S2 normal, no murmur.  Abdomen: Soft, non-tender, bowel sounds are active.  Extremities: Trace edema bilaterally.  Skin: Warm and dry.    []         Post-cath site without hematoma, bruit, tenderness, or thrill.  Distal pulses intact.    PMH/SH reviewed - no change compared to H&P    Data Review    Telemetry: normal sinus rhythm     EKG:   []  No new EKG for review    Lab Data Personally Reviewed:    Recent Labs     07/14/24  0640 07/15/24  0636   WBC 3.6 3.7   HGB 10.7* 10.6*   HCT 34.4* 33.9*   PLT 87* 94*       No results for input(s): \"INR\", \"PROTIME\", \"APTT\" in the last 72 hours.   Recent Labs     07/13/24  0618 07/14/24  0640 07/15/24  0636     141 141   K 3.5 3.4* 3.5   CL 97 98 99   CO2 39* 39* 39*   BUN 26* 26* 33*   CREATININE 0.60 0.59 0.61   GLUCOSE 92 91 91   CALCIUM 9.8 10.3* 10.1   MG 1.9 2.0 2.0       No results for input(s): \"CKTOTAL\", \"CKMB\", \"TROPONINI\", \"BNP\", \"PROBNP\", \"NTPROBNP\" in the last 72 hours.    Invalid input(s): \"CKINDEX\"    No results found for: \"CHOL\", \"TRIG\", \"HDL\", \"VLDL\", \"CHOLHDLRATIO\"    Recent Labs     07/13/24  0618 07/14/24  0640 07/15/24  0636   AST 25 21 27   ALKPHOS 141* 139* 144*   GLOB 2.4 2.4 2.5       No results for input(s): \"PH\", \"PCO2\", \"PO2\" in the last 72 hours.    Medications Personally Reviewed:    Current Facility-Administered Medications   Medication Dose Route Frequency    allopurinol (ZYLOPRIM) tablet 300 mg  300 mg Oral Daily    carvedilol (COREG) tablet 12.5 mg  12.5 mg Oral BID    rivaroxaban (XARELTO) tablet 10 mg  10 mg Oral Daily with breakfast    HYDROcodone-acetaminophen (NORCO) 5-325 MG per tablet 1 tablet  1 tablet Oral Q6H PRN    sodium chloride flush 0.9 % injection 5-40 mL  5-40 mL IntraVENous 2 times per day    sodium chloride flush 0.9 % injection 5-40 mL  5-40 mL IntraVENous PRN    0.9 % sodium chloride infusion   IntraVENous PRN    potassium chloride (KLOR-CON M) extended release tablet 40 mEq  40 mEq Oral PRN    Or    potassium bicarb-citric acid (EFFER-K) effervescent tablet 40 mEq  40 mEq Oral PRN    Or    potassium chloride 10 mEq/100 mL IVPB (Peripheral Line)  10 mEq IntraVENous PRN    magnesium sulfate 2000 mg in 50 mL IVPB premix  2,000 mg IntraVENous PRN    ondansetron (ZOFRAN-ODT) disintegrating tablet 4 mg  4 mg Oral Q8H PRN    Or    ondansetron (ZOFRAN) injection 4 mg  4 mg IntraVENous Q6H PRN    polyethylene glycol (GLYCOLAX) packet 17 g  17 g Oral Daily PRN    acetaminophen (TYLENOL) tablet 650 mg  650 mg Oral Q6H PRN    Or    acetaminophen (TYLENOL) suppository 650 mg  650 mg Rectal Q6H PRN    [Held by provider] furosemide (LASIX) injection 40 mg  40 mg IntraVENous Daily

## 2024-07-15 NOTE — PROGRESS NOTES
Discharge paperwork started. Waiting for follow up appointments to be made by . Print and send with patient when patient is ready to leave. Primary nurse aware.

## 2024-07-15 NOTE — CARE COORDINATION
CM noted dc order.     CM spoke with patient's daughter who states that she is on the way to  the patient for discharge.    Second IMM delivered 7/14.    [facility]    Transition of Care Plan:    RUR: 15  Prior Level of Functioning: Needs assistance  Disposition: Home Health  If SNF or IPR: Date FOC offered: N/a  Date FOC received: N/a  Accepting facility: [facility]  Date authorization started with reference number: N/a  Date authorization received and expires: N/a  Follow up appointments: Per MD  DME needed: N/a  Transportation at discharge: Daughter  IM/IMM Medicare/ letter given: 7/14  Is patient a [place] and connected with [facility]? N/a   If yes, was  transfer form completed and [facility] notified?   Caregiver Contact: DaughterChristal, [phone]  Discharge Caregiver contacted prior to discharge?- yes  Care Conference needed? No  Barriers to discharge: None

## 2024-08-08 NOTE — PROGRESS NOTES
Physician Progress Note      PATIENT:               SHAWN VINCENT  [facility] #:                  [id number]  :                       1945  ADMIT DATE:       7/10/2024 2:15 PM  DISCH DATE:        7/15/2024 12:29 PM  RESPONDING  PROVIDER #:        Adilia Jessica NP          QUERY TEXT:    Pt admitted with SOB and has Acute CHF exacerbation per DC Summary documented.   If possible, please document in progress notes and discharge summary further   specificity regarding the type and acuity of CHF:    The medical record reflects the following:  Risk Factors: HTN, Multiple Myeloma, CHF    Clinical Indicators: SOB, weakness, lower extremity edema  -Echo EF 65-70%, hyperdynamic LV systolic function  -NT pro     Treatment: IV Lasix  -Coreg  -Cardiology consult  -Continue tele    Please call if you have any questions or need assistance. I can also be   reached via [employer] or [employer] # [phone].  Thank you,  Snehal Cavanaugh RN/CDI  Options provided:  -- Acute on Chronic Systolic CHF/HFrEF  -- Acute on Chronic Diastolic CHF/HFpEF  -- Acute on Chronic Systolic and Diastolic CHF  -- Acute Systolic CHF/HFrEF  -- Acute Diastolic CHF/HFpEF  -- Acute Systolic and Diastolic CHF  -- Chronic Systolic CHF/HFrEF  -- Chronic Diastolic CHF/HFpEF  -- Chronic Systolic and Diastolic CHF  -- Other - I will add my own diagnosis  -- Disagree - Not applicable / Not valid  -- Disagree - Clinically unable to determine / Unknown  -- Refer to Clinical Documentation Reviewer    PROVIDER RESPONSE TEXT:    This patient is in acute systolic CHF/HFrEF.    Query created by: Snehla Cavanaugh on 2024 7:37 AM      Electronically signed by:  Adilia Jessica NP 2024 10:26 AM